# Patient Record
Sex: FEMALE | Race: WHITE | Employment: PART TIME | URBAN - METROPOLITAN AREA
[De-identification: names, ages, dates, MRNs, and addresses within clinical notes are randomized per-mention and may not be internally consistent; named-entity substitution may affect disease eponyms.]

---

## 2017-01-30 PROBLEM — M65.311 TRIGGER FINGER OF RIGHT THUMB: Status: ACTIVE | Noted: 2017-01-30

## 2017-02-21 ENCOUNTER — HOSPITAL ENCOUNTER (OUTPATIENT)
Dept: PREADMISSION TESTING | Facility: CLINIC | Age: 52
Discharge: HOME | End: 2017-02-21
Payer: COMMERCIAL

## 2017-02-21 VITALS
TEMPERATURE: 97 F | DIASTOLIC BLOOD PRESSURE: 92 MMHG | HEIGHT: 67 IN | OXYGEN SATURATION: 100 % | HEART RATE: 71 BPM | WEIGHT: 280 LBS | SYSTOLIC BLOOD PRESSURE: 178 MMHG | BODY MASS INDEX: 43.95 KG/M2 | RESPIRATION RATE: 16 BRPM

## 2017-02-21 PROCEDURE — 93005 ELECTROCARDIOGRAM TRACING: CPT

## 2017-02-23 ENCOUNTER — SURGERY (OUTPATIENT)
Age: 52
End: 2017-02-23

## 2017-02-23 ENCOUNTER — ANESTHESIA EVENT (OUTPATIENT)
Dept: OPERATING ROOM | Facility: CLINIC | Age: 52
End: 2017-02-23
Payer: COMMERCIAL

## 2017-02-23 ENCOUNTER — HOSPITAL ENCOUNTER (OUTPATIENT)
Facility: CLINIC | Age: 52
Setting detail: OUTPATIENT SURGERY
Discharge: HOME | End: 2017-02-23
Attending: PLASTIC SURGERY | Admitting: PLASTIC SURGERY
Payer: COMMERCIAL

## 2017-02-23 ENCOUNTER — ANESTHESIA (OUTPATIENT)
Dept: OPERATING ROOM | Facility: CLINIC | Age: 52
End: 2017-02-23
Payer: COMMERCIAL

## 2017-02-23 VITALS
BODY MASS INDEX: 43.95 KG/M2 | DIASTOLIC BLOOD PRESSURE: 80 MMHG | TEMPERATURE: 96.8 F | OXYGEN SATURATION: 92 % | HEIGHT: 67 IN | RESPIRATION RATE: 17 BRPM | HEART RATE: 68 BPM | SYSTOLIC BLOOD PRESSURE: 130 MMHG | WEIGHT: 280 LBS

## 2017-02-23 VITALS — DIASTOLIC BLOOD PRESSURE: 63 MMHG | TEMPERATURE: 96.2 F | SYSTOLIC BLOOD PRESSURE: 127 MMHG | OXYGEN SATURATION: 95 %

## 2017-02-23 LAB — HCG, PREGNANCY URINE (POC): NEGATIVE

## 2017-02-23 PROCEDURE — 6360000002 HC RX W HCPCS: Performed by: PLASTIC SURGERY

## 2017-02-23 PROCEDURE — 6370000000 HC RX 637 (ALT 250 FOR IP): Performed by: PLASTIC SURGERY

## 2017-02-23 PROCEDURE — 3700000000 HC ANESTHESIA ATTENDED CARE: Performed by: PLASTIC SURGERY

## 2017-02-23 PROCEDURE — 7100000000 HC PACU RECOVERY - FIRST 15 MIN: Performed by: PLASTIC SURGERY

## 2017-02-23 PROCEDURE — 3600000002 HC SURGERY LEVEL 2 BASE: Performed by: PLASTIC SURGERY

## 2017-02-23 PROCEDURE — 7100000001 HC PACU RECOVERY - ADDTL 15 MIN: Performed by: PLASTIC SURGERY

## 2017-02-23 PROCEDURE — 3700000001 HC ADD 15 MINUTES (ANESTHESIA): Performed by: PLASTIC SURGERY

## 2017-02-23 PROCEDURE — 7100000010 HC PHASE II RECOVERY - FIRST 15 MIN: Performed by: PLASTIC SURGERY

## 2017-02-23 PROCEDURE — 6360000002 HC RX W HCPCS: Performed by: ANESTHESIOLOGY

## 2017-02-23 PROCEDURE — 2500000003 HC RX 250 WO HCPCS: Performed by: PLASTIC SURGERY

## 2017-02-23 PROCEDURE — 2500000003 HC RX 250 WO HCPCS: Performed by: ANESTHESIOLOGY

## 2017-02-23 PROCEDURE — 7100000011 HC PHASE II RECOVERY - ADDTL 15 MIN: Performed by: PLASTIC SURGERY

## 2017-02-23 PROCEDURE — 3600000012 HC SURGERY LEVEL 2 ADDTL 15MIN: Performed by: PLASTIC SURGERY

## 2017-02-23 PROCEDURE — 2580000003 HC RX 258: Performed by: ANESTHESIOLOGY

## 2017-02-23 PROCEDURE — 84703 CHORIONIC GONADOTROPIN ASSAY: CPT

## 2017-02-23 RX ORDER — OXYCODONE HYDROCHLORIDE AND ACETAMINOPHEN 5; 325 MG/1; MG/1
1 TABLET ORAL ONCE
Status: COMPLETED | OUTPATIENT
Start: 2017-02-23 | End: 2017-02-23

## 2017-02-23 RX ORDER — SODIUM CHLORIDE, SODIUM LACTATE, POTASSIUM CHLORIDE, CALCIUM CHLORIDE 600; 310; 30; 20 MG/100ML; MG/100ML; MG/100ML; MG/100ML
INJECTION, SOLUTION INTRAVENOUS CONTINUOUS
Status: DISCONTINUED | OUTPATIENT
Start: 2017-02-23 | End: 2017-02-23 | Stop reason: HOSPADM

## 2017-02-23 RX ORDER — GINSENG 100 MG
CAPSULE ORAL PRN
Status: DISCONTINUED | OUTPATIENT
Start: 2017-02-23 | End: 2017-02-23 | Stop reason: HOSPADM

## 2017-02-23 RX ORDER — FENTANYL CITRATE 50 UG/ML
50 INJECTION, SOLUTION INTRAMUSCULAR; INTRAVENOUS EVERY 5 MIN PRN
Status: DISCONTINUED | OUTPATIENT
Start: 2017-02-23 | End: 2017-02-23 | Stop reason: HOSPADM

## 2017-02-23 RX ORDER — ONDANSETRON 2 MG/ML
4 INJECTION INTRAMUSCULAR; INTRAVENOUS
Status: DISCONTINUED | OUTPATIENT
Start: 2017-02-23 | End: 2017-02-23 | Stop reason: HOSPADM

## 2017-02-23 RX ORDER — CEPHALEXIN 500 MG/1
500 CAPSULE ORAL 3 TIMES DAILY
Qty: 21 CAPSULE | Refills: 0 | Status: SHIPPED | OUTPATIENT
Start: 2017-02-23 | End: 2017-03-02

## 2017-02-23 RX ORDER — MEPERIDINE HYDROCHLORIDE 50 MG/ML
12.5 INJECTION INTRAMUSCULAR; INTRAVENOUS; SUBCUTANEOUS EVERY 5 MIN PRN
Status: DISCONTINUED | OUTPATIENT
Start: 2017-02-23 | End: 2017-02-23 | Stop reason: HOSPADM

## 2017-02-23 RX ORDER — SODIUM CHLORIDE, SODIUM LACTATE, POTASSIUM CHLORIDE, CALCIUM CHLORIDE 600; 310; 30; 20 MG/100ML; MG/100ML; MG/100ML; MG/100ML
INJECTION, SOLUTION INTRAVENOUS CONTINUOUS PRN
Status: DISCONTINUED | OUTPATIENT
Start: 2017-02-23 | End: 2017-02-23 | Stop reason: SDUPTHER

## 2017-02-23 RX ORDER — PROPOFOL 10 MG/ML
INJECTION, EMULSION INTRAVENOUS PRN
Status: DISCONTINUED | OUTPATIENT
Start: 2017-02-23 | End: 2017-02-23 | Stop reason: SDUPTHER

## 2017-02-23 RX ORDER — BUPIVACAINE HYDROCHLORIDE 2.5 MG/ML
INJECTION, SOLUTION INFILTRATION; PERINEURAL PRN
Status: DISCONTINUED | OUTPATIENT
Start: 2017-02-23 | End: 2017-02-23 | Stop reason: HOSPADM

## 2017-02-23 RX ORDER — ONDANSETRON 2 MG/ML
INJECTION INTRAMUSCULAR; INTRAVENOUS PRN
Status: DISCONTINUED | OUTPATIENT
Start: 2017-02-23 | End: 2017-02-23 | Stop reason: SDUPTHER

## 2017-02-23 RX ORDER — FENTANYL CITRATE 50 UG/ML
25 INJECTION, SOLUTION INTRAMUSCULAR; INTRAVENOUS EVERY 5 MIN PRN
Status: DISCONTINUED | OUTPATIENT
Start: 2017-02-23 | End: 2017-02-23 | Stop reason: HOSPADM

## 2017-02-23 RX ORDER — DEXAMETHASONE SODIUM PHOSPHATE 10 MG/ML
INJECTION INTRAMUSCULAR; INTRAVENOUS PRN
Status: DISCONTINUED | OUTPATIENT
Start: 2017-02-23 | End: 2017-02-23 | Stop reason: SDUPTHER

## 2017-02-23 RX ORDER — FENTANYL CITRATE 50 UG/ML
INJECTION, SOLUTION INTRAMUSCULAR; INTRAVENOUS PRN
Status: DISCONTINUED | OUTPATIENT
Start: 2017-02-23 | End: 2017-02-23 | Stop reason: SDUPTHER

## 2017-02-23 RX ORDER — LIDOCAINE HYDROCHLORIDE 10 MG/ML
INJECTION, SOLUTION EPIDURAL; INFILTRATION; INTRACAUDAL; PERINEURAL PRN
Status: DISCONTINUED | OUTPATIENT
Start: 2017-02-23 | End: 2017-02-23 | Stop reason: SDUPTHER

## 2017-02-23 RX ORDER — OXYCODONE HYDROCHLORIDE AND ACETAMINOPHEN 5; 325 MG/1; MG/1
2 TABLET ORAL EVERY 4 HOURS PRN
Qty: 30 TABLET | Refills: 0 | Status: SHIPPED | OUTPATIENT
Start: 2017-02-23 | End: 2017-03-02

## 2017-02-23 RX ADMIN — DEXAMETHASONE SODIUM PHOSPHATE 10 MG: 10 INJECTION INTRAMUSCULAR; INTRAVENOUS at 09:47

## 2017-02-23 RX ADMIN — SODIUM CHLORIDE, POTASSIUM CHLORIDE, SODIUM LACTATE AND CALCIUM CHLORIDE: 600; 310; 30; 20 INJECTION, SOLUTION INTRAVENOUS at 09:35

## 2017-02-23 RX ADMIN — BUPIVACAINE HYDROCHLORIDE 5 ML: 2.5 INJECTION, SOLUTION INFILTRATION; PERINEURAL at 09:58

## 2017-02-23 RX ADMIN — LIDOCAINE HYDROCHLORIDE 50 MG: 10 INJECTION, SOLUTION EPIDURAL; INFILTRATION; INTRACAUDAL; PERINEURAL at 09:39

## 2017-02-23 RX ADMIN — BACITRACIN 1 EACH: 500 OINTMENT TOPICAL at 09:58

## 2017-02-23 RX ADMIN — PROPOFOL 200 MG: 10 INJECTION, EMULSION INTRAVENOUS at 09:39

## 2017-02-23 RX ADMIN — OXYCODONE HYDROCHLORIDE AND ACETAMINOPHEN 1 TABLET: 5; 325 TABLET ORAL at 11:02

## 2017-02-23 RX ADMIN — SODIUM CHLORIDE, POTASSIUM CHLORIDE, SODIUM LACTATE AND CALCIUM CHLORIDE: 600; 310; 30; 20 INJECTION, SOLUTION INTRAVENOUS at 09:10

## 2017-02-23 RX ADMIN — Medication 2 G: at 09:32

## 2017-02-23 RX ADMIN — PROPOFOL 100 MG: 10 INJECTION, EMULSION INTRAVENOUS at 09:55

## 2017-02-23 RX ADMIN — FENTANYL CITRATE 25 MCG: 50 INJECTION, SOLUTION INTRAMUSCULAR; INTRAVENOUS at 09:55

## 2017-02-23 RX ADMIN — FENTANYL CITRATE 25 MCG: 50 INJECTION, SOLUTION INTRAMUSCULAR; INTRAVENOUS at 09:51

## 2017-02-23 RX ADMIN — FENTANYL CITRATE 25 MCG: 50 INJECTION, SOLUTION INTRAMUSCULAR; INTRAVENOUS at 10:30

## 2017-02-23 RX ADMIN — FENTANYL CITRATE 50 MCG: 50 INJECTION, SOLUTION INTRAMUSCULAR; INTRAVENOUS at 09:39

## 2017-02-23 RX ADMIN — ONDANSETRON 4 MG: 2 INJECTION INTRAMUSCULAR; INTRAVENOUS at 09:47

## 2017-02-23 ASSESSMENT — PAIN - FUNCTIONAL ASSESSMENT: PAIN_FUNCTIONAL_ASSESSMENT: 0-10

## 2017-02-23 ASSESSMENT — ENCOUNTER SYMPTOMS: STRIDOR: 0

## 2017-02-23 ASSESSMENT — PAIN DESCRIPTION - PAIN TYPE
TYPE: SURGICAL PAIN
TYPE: SURGICAL PAIN

## 2017-02-23 ASSESSMENT — PAIN SCALES - GENERAL
PAINLEVEL_OUTOF10: 9
PAINLEVEL_OUTOF10: 9

## 2017-02-23 ASSESSMENT — PAIN DESCRIPTION - ORIENTATION: ORIENTATION: RIGHT

## 2017-02-23 ASSESSMENT — PAIN DESCRIPTION - LOCATION: LOCATION: FINGER (COMMENT WHICH ONE)

## 2017-02-24 LAB
EKG ATRIAL RATE: 67 BPM
EKG P AXIS: 36 DEGREES
EKG P-R INTERVAL: 128 MS
EKG Q-T INTERVAL: 384 MS
EKG QRS DURATION: 60 MS
EKG QTC CALCULATION (BAZETT): 405 MS
EKG R AXIS: 34 DEGREES
EKG T AXIS: 18 DEGREES
EKG VENTRICULAR RATE: 67 BPM

## 2017-05-30 ENCOUNTER — HOSPITAL ENCOUNTER (OUTPATIENT)
Age: 52
Discharge: HOME | End: 2017-05-30
Payer: COMMERCIAL

## 2017-05-30 LAB
ABSOLUTE EOS #: 0.3 K/UL (ref 0–0.4)
ABSOLUTE LYMPH #: 1.6 K/UL (ref 1–4.8)
ABSOLUTE MONO #: 0.4 K/UL (ref 0.1–1.3)
ALBUMIN SERPL-MCNC: 4.4 G/DL (ref 3.5–5.2)
ALBUMIN/GLOBULIN RATIO: ABNORMAL (ref 1–2.5)
ALP BLD-CCNC: 86 U/L (ref 35–104)
ALT SERPL-CCNC: 25 U/L (ref 5–33)
ANION GAP SERPL CALCULATED.3IONS-SCNC: 16 MMOL/L (ref 9–17)
AST SERPL-CCNC: 17 U/L
BASOPHILS # BLD: 1 %
BASOPHILS ABSOLUTE: 0 K/UL (ref 0–0.2)
BILIRUB SERPL-MCNC: 0.37 MG/DL (ref 0.3–1.2)
BUN BLDV-MCNC: 14 MG/DL (ref 6–20)
BUN/CREAT BLD: ABNORMAL (ref 9–20)
CALCIUM SERPL-MCNC: 9.2 MG/DL (ref 8.6–10.4)
CHLORIDE BLD-SCNC: 105 MMOL/L (ref 98–107)
CHOLESTEROL/HDL RATIO: 8.3
CHOLESTEROL: 231 MG/DL
CO2: 22 MMOL/L (ref 20–31)
CREAT SERPL-MCNC: 0.81 MG/DL (ref 0.5–0.9)
DIFFERENTIAL TYPE: ABNORMAL
EOSINOPHILS RELATIVE PERCENT: 4 %
GFR AFRICAN AMERICAN: >60 ML/MIN
GFR NON-AFRICAN AMERICAN: >60 ML/MIN
GFR SERPL CREATININE-BSD FRML MDRD: ABNORMAL ML/MIN/{1.73_M2}
GFR SERPL CREATININE-BSD FRML MDRD: ABNORMAL ML/MIN/{1.73_M2}
GLUCOSE BLD-MCNC: 111 MG/DL (ref 70–99)
HCT VFR BLD CALC: 43.5 % (ref 36–46)
HDLC SERPL-MCNC: 28 MG/DL
HEMOGLOBIN: 14.3 G/DL (ref 12–16)
LDL CHOLESTEROL: 145 MG/DL (ref 0–130)
LYMPHOCYTES # BLD: 22 %
MCH RBC QN AUTO: 26.3 PG (ref 26–34)
MCHC RBC AUTO-ENTMCNC: 32.8 G/DL (ref 31–37)
MCV RBC AUTO: 80.1 FL (ref 80–100)
MONOCYTES # BLD: 6 %
PDW BLD-RTO: 14.8 % (ref 11.5–14.9)
PLATELET # BLD: 290 K/UL (ref 150–450)
PLATELET ESTIMATE: ABNORMAL
PMV BLD AUTO: 7.9 FL (ref 6–12)
POTASSIUM SERPL-SCNC: 4 MMOL/L (ref 3.7–5.3)
RBC # BLD: 5.43 M/UL (ref 4–5.2)
RBC # BLD: ABNORMAL 10*6/UL
SEG NEUTROPHILS: 67 %
SEGMENTED NEUTROPHILS ABSOLUTE COUNT: 5 K/UL (ref 1.3–9.1)
SODIUM BLD-SCNC: 143 MMOL/L (ref 135–144)
TOTAL PROTEIN: 7.7 G/DL (ref 6.4–8.3)
TRIGL SERPL-MCNC: 289 MG/DL
TSH SERPL DL<=0.05 MIU/L-ACNC: 1.8 MIU/L (ref 0.3–5)
VLDLC SERPL CALC-MCNC: ABNORMAL MG/DL (ref 1–30)
WBC # BLD: 7.4 K/UL (ref 3.5–11)
WBC # BLD: ABNORMAL 10*3/UL

## 2017-05-30 PROCEDURE — 83036 HEMOGLOBIN GLYCOSYLATED A1C: CPT

## 2017-05-30 PROCEDURE — 85025 COMPLETE CBC W/AUTO DIFF WBC: CPT

## 2017-05-30 PROCEDURE — 36415 COLL VENOUS BLD VENIPUNCTURE: CPT

## 2017-05-30 PROCEDURE — 80061 LIPID PANEL: CPT

## 2017-05-30 PROCEDURE — 84443 ASSAY THYROID STIM HORMONE: CPT

## 2017-05-30 PROCEDURE — 80053 COMPREHEN METABOLIC PANEL: CPT

## 2017-05-31 LAB
ESTIMATED AVERAGE GLUCOSE: 111 MG/DL
HBA1C MFR BLD: 5.5 % (ref 4–6)

## 2018-09-30 ENCOUNTER — HOSPITAL ENCOUNTER (EMERGENCY)
Age: 53
Discharge: HOME | End: 2018-09-30
Attending: EMERGENCY MEDICINE
Payer: COMMERCIAL

## 2018-09-30 VITALS
HEART RATE: 80 BPM | RESPIRATION RATE: 20 BRPM | WEIGHT: 280 LBS | SYSTOLIC BLOOD PRESSURE: 135 MMHG | TEMPERATURE: 96.8 F | HEIGHT: 66 IN | BODY MASS INDEX: 45 KG/M2 | DIASTOLIC BLOOD PRESSURE: 55 MMHG | OXYGEN SATURATION: 96 %

## 2018-09-30 DIAGNOSIS — R10.13 EPIGASTRIC PAIN: Primary | ICD-10-CM

## 2018-09-30 DIAGNOSIS — R21 RASH AND OTHER NONSPECIFIC SKIN ERUPTION: ICD-10-CM

## 2018-09-30 LAB
ABSOLUTE EOS #: 0.2 K/UL (ref 0–0.4)
ABSOLUTE IMMATURE GRANULOCYTE: NORMAL K/UL (ref 0–0.3)
ABSOLUTE LYMPH #: 1.3 K/UL (ref 1–4.8)
ABSOLUTE MONO #: 0.3 K/UL (ref 0.1–1.3)
ALBUMIN SERPL-MCNC: 4.1 G/DL (ref 3.5–5.2)
ALBUMIN/GLOBULIN RATIO: ABNORMAL (ref 1–2.5)
ALP BLD-CCNC: 90 U/L (ref 35–104)
ALT SERPL-CCNC: 23 U/L (ref 5–33)
ANION GAP SERPL CALCULATED.3IONS-SCNC: 14 MMOL/L (ref 9–17)
AST SERPL-CCNC: 18 U/L
BASOPHILS # BLD: 1 % (ref 0–2)
BASOPHILS ABSOLUTE: 0 K/UL (ref 0–0.2)
BILIRUB SERPL-MCNC: 0.38 MG/DL (ref 0.3–1.2)
BUN BLDV-MCNC: 9 MG/DL (ref 6–20)
BUN/CREAT BLD: ABNORMAL (ref 9–20)
CALCIUM SERPL-MCNC: 8.7 MG/DL (ref 8.6–10.4)
CHLORIDE BLD-SCNC: 104 MMOL/L (ref 98–107)
CO2: 22 MMOL/L (ref 20–31)
CREAT SERPL-MCNC: 0.8 MG/DL (ref 0.5–0.9)
DIFFERENTIAL TYPE: NORMAL
EKG ATRIAL RATE: 78 BPM
EKG P AXIS: 58 DEGREES
EKG P-R INTERVAL: 170 MS
EKG Q-T INTERVAL: 390 MS
EKG QRS DURATION: 86 MS
EKG QTC CALCULATION (BAZETT): 444 MS
EKG R AXIS: 27 DEGREES
EKG T AXIS: 19 DEGREES
EKG VENTRICULAR RATE: 78 BPM
EOSINOPHILS RELATIVE PERCENT: 4 % (ref 0–4)
GFR AFRICAN AMERICAN: >60 ML/MIN
GFR NON-AFRICAN AMERICAN: >60 ML/MIN
GFR SERPL CREATININE-BSD FRML MDRD: ABNORMAL ML/MIN/{1.73_M2}
GFR SERPL CREATININE-BSD FRML MDRD: ABNORMAL ML/MIN/{1.73_M2}
GLUCOSE BLD-MCNC: 103 MG/DL (ref 70–99)
HCT VFR BLD CALC: 39.1 % (ref 36–46)
HEMOGLOBIN: 12.9 G/DL (ref 12–16)
IMMATURE GRANULOCYTES: NORMAL %
LYMPHOCYTES # BLD: 27 % (ref 24–44)
MCH RBC QN AUTO: 26.5 PG (ref 26–34)
MCHC RBC AUTO-ENTMCNC: 33 G/DL (ref 31–37)
MCV RBC AUTO: 80.5 FL (ref 80–100)
MONOCYTES # BLD: 6 % (ref 1–7)
MYOGLOBIN: 53 NG/ML (ref 25–58)
NRBC AUTOMATED: NORMAL PER 100 WBC
PDW BLD-RTO: 14.8 % (ref 11.5–14.9)
PLATELET # BLD: 242 K/UL (ref 150–450)
PLATELET ESTIMATE: NORMAL
PMV BLD AUTO: 8 FL (ref 6–12)
POTASSIUM SERPL-SCNC: 3.7 MMOL/L (ref 3.7–5.3)
RBC # BLD: 4.85 M/UL (ref 4–5.2)
RBC # BLD: NORMAL 10*6/UL
SEG NEUTROPHILS: 62 % (ref 36–66)
SEGMENTED NEUTROPHILS ABSOLUTE COUNT: 3.1 K/UL (ref 1.3–9.1)
SODIUM BLD-SCNC: 140 MMOL/L (ref 135–144)
TOTAL PROTEIN: 7 G/DL (ref 6.4–8.3)
TROPONIN INTERP: NORMAL
TROPONIN T: <0.03 NG/ML
WBC # BLD: 5 K/UL (ref 3.5–11)
WBC # BLD: NORMAL 10*3/UL

## 2018-09-30 PROCEDURE — 85025 COMPLETE CBC W/AUTO DIFF WBC: CPT

## 2018-09-30 PROCEDURE — 2580000003 HC RX 258: Performed by: EMERGENCY MEDICINE

## 2018-09-30 PROCEDURE — 99284 EMERGENCY DEPT VISIT MOD MDM: CPT

## 2018-09-30 PROCEDURE — 6370000000 HC RX 637 (ALT 250 FOR IP): Performed by: EMERGENCY MEDICINE

## 2018-09-30 PROCEDURE — 96372 THER/PROPH/DIAG INJ SC/IM: CPT

## 2018-09-30 PROCEDURE — 83874 ASSAY OF MYOGLOBIN: CPT

## 2018-09-30 PROCEDURE — 6360000002 HC RX W HCPCS: Performed by: EMERGENCY MEDICINE

## 2018-09-30 PROCEDURE — 2500000003 HC RX 250 WO HCPCS: Performed by: EMERGENCY MEDICINE

## 2018-09-30 PROCEDURE — 36415 COLL VENOUS BLD VENIPUNCTURE: CPT

## 2018-09-30 PROCEDURE — 96374 THER/PROPH/DIAG INJ IV PUSH: CPT

## 2018-09-30 PROCEDURE — 93005 ELECTROCARDIOGRAM TRACING: CPT

## 2018-09-30 PROCEDURE — 80053 COMPREHEN METABOLIC PANEL: CPT

## 2018-09-30 PROCEDURE — 84484 ASSAY OF TROPONIN QUANT: CPT

## 2018-09-30 PROCEDURE — S0028 INJECTION, FAMOTIDINE, 20 MG: HCPCS | Performed by: EMERGENCY MEDICINE

## 2018-09-30 RX ORDER — PROMETHAZINE HYDROCHLORIDE 25 MG/ML
25 INJECTION, SOLUTION INTRAMUSCULAR; INTRAVENOUS ONCE
Status: COMPLETED | OUTPATIENT
Start: 2018-09-30 | End: 2018-09-30

## 2018-09-30 RX ORDER — MAGNESIUM HYDROXIDE/ALUMINUM HYDROXICE/SIMETHICONE 120; 1200; 1200 MG/30ML; MG/30ML; MG/30ML
30 SUSPENSION ORAL
Status: COMPLETED | OUTPATIENT
Start: 2018-09-30 | End: 2018-09-30

## 2018-09-30 RX ORDER — 0.9 % SODIUM CHLORIDE 0.9 %
1000 INTRAVENOUS SOLUTION INTRAVENOUS ONCE
Status: COMPLETED | OUTPATIENT
Start: 2018-09-30 | End: 2018-09-30

## 2018-09-30 RX ORDER — ESOMEPRAZOLE MAGNESIUM 40 MG/1
40 CAPSULE, DELAYED RELEASE ORAL
Qty: 30 CAPSULE | Refills: 0 | Status: SHIPPED | OUTPATIENT
Start: 2018-09-30 | End: 2020-01-10

## 2018-09-30 RX ORDER — PROMETHAZINE HYDROCHLORIDE 25 MG/ML
12.5 INJECTION, SOLUTION INTRAMUSCULAR; INTRAVENOUS ONCE
Status: DISCONTINUED | OUTPATIENT
Start: 2018-09-30 | End: 2018-09-30

## 2018-09-30 RX ORDER — PERMETHRIN 50 MG/G
CREAM TOPICAL
Qty: 1 TUBE | Refills: 1 | Status: SHIPPED | OUTPATIENT
Start: 2018-09-30 | End: 2020-01-10

## 2018-09-30 RX ADMIN — PROMETHAZINE HYDROCHLORIDE 25 MG: 25 INJECTION INTRAMUSCULAR; INTRAVENOUS at 20:49

## 2018-09-30 RX ADMIN — SODIUM CHLORIDE 1000 ML: 9 INJECTION, SOLUTION INTRAVENOUS at 20:54

## 2018-09-30 RX ADMIN — ALUMINUM HYDROXIDE, MAGNESIUM HYDROXIDE, AND SIMETHICONE 30 ML: 200; 200; 20 SUSPENSION ORAL at 20:49

## 2018-09-30 RX ADMIN — FAMOTIDINE 20 MG: 10 INJECTION, SOLUTION INTRAVENOUS at 20:49

## 2018-09-30 RX ADMIN — LIDOCAINE HYDROCHLORIDE 15 ML: 20 SOLUTION ORAL; TOPICAL at 20:49

## 2018-09-30 ASSESSMENT — ENCOUNTER SYMPTOMS
COUGH: 0
EYE PAIN: 0
ABDOMINAL PAIN: 1
VOMITING: 0
SHORTNESS OF BREATH: 0
NAUSEA: 1
RHINORRHEA: 0
BACK PAIN: 0
EYE REDNESS: 0

## 2018-09-30 ASSESSMENT — PAIN DESCRIPTION - PAIN TYPE
TYPE: ACUTE PAIN;CHRONIC PAIN
TYPE: ACUTE PAIN;CHRONIC PAIN

## 2018-09-30 ASSESSMENT — PAIN SCALES - GENERAL
PAINLEVEL_OUTOF10: 9
PAINLEVEL_OUTOF10: 9

## 2018-10-01 NOTE — ED PROVIDER NOTES
[facility] OR    TONSILLECTOMY         CURRENT MEDICATIONS       Previous Medications    ACETAMINOPHEN (TYLENOL) 325 MG TABLET    Take 650 mg by mouth every 6 hours as needed for Pain       ALLERGIES     Asa [aspirin] and Ibuprofen    FAMILY HISTORY       No family status information on file. History reviewed. No pertinent family history. SOCIAL HISTORY       Social History     Social History    Marital status: Single     Spouse name: N/A    Number of children: N/A    Years of education: N/A     Social History Main Topics    Smoking status: Never Smoker    Smokeless tobacco: Never Used    Alcohol use No    Drug use: No    Sexual activity: Not Asked     Other Topics Concern    None     Social History Narrative    None       PHYSICAL EXAM     INITIAL VITALS:  height is 5' 6\" (1.676 m) and weight is 280 lb (127 kg). Her oral temperature is 96.8 °F (36 °C). Her blood pressure is 175/84 (abnormal) and her pulse is 77. Her respiration is 16 and oxygen saturation is 97%. Physical Exam   Constitutional: She is oriented to person, place, and time. She appears well-developed and well-nourished. HENT:   Head: Normocephalic and atraumatic. Nose: Nose normal.   Mouth/Throat: Oropharynx is clear and moist.   Eyes: Pupils are equal, round, and reactive to light. Conjunctivae and EOM are normal.   Neck: Normal range of motion. Neck supple. Cardiovascular: Normal rate, regular rhythm, normal heart sounds and intact distal pulses. Pulmonary/Chest: Effort normal and breath sounds normal.   Abdominal: Soft. Bowel sounds are normal. There is tenderness (Tenderness mild epigastric). Musculoskeletal: Normal range of motion. Neurological: She is alert and oriented to person, place, and time. Skin: Skin is warm and dry. Multiple skin lesions throughout the body individual appear to be isolated vesicular that rupture   Nursing note and vitals reviewed. Natasha Glasgow     DIFFERENTIAL DIAGNOSIS/ MDM:     Patient

## 2019-04-16 ENCOUNTER — HOSPITAL ENCOUNTER (OUTPATIENT)
Age: 54
Discharge: HOME | End: 2019-04-16
Payer: COMMERCIAL

## 2019-04-16 LAB
ABSOLUTE EOS #: 0.26 K/UL (ref 0–0.4)
ABSOLUTE IMMATURE GRANULOCYTE: ABNORMAL K/UL (ref 0–0.3)
ABSOLUTE LYMPH #: 1.28 K/UL (ref 1–4.8)
ABSOLUTE MONO #: 0.41 K/UL (ref 0.1–1.3)
ALBUMIN SERPL-MCNC: 3.9 G/DL (ref 3.5–5.2)
ALBUMIN/GLOBULIN RATIO: ABNORMAL (ref 1–2.5)
ALP BLD-CCNC: 91 U/L (ref 35–104)
ALT SERPL-CCNC: 17 U/L (ref 5–33)
ANION GAP SERPL CALCULATED.3IONS-SCNC: 9 MMOL/L (ref 9–17)
AST SERPL-CCNC: 13 U/L
BASOPHILS # BLD: 1 % (ref 0–2)
BASOPHILS ABSOLUTE: 0.05 K/UL (ref 0–0.2)
BILIRUB SERPL-MCNC: 0.22 MG/DL (ref 0.3–1.2)
BUN BLDV-MCNC: 9 MG/DL (ref 6–20)
BUN/CREAT BLD: ABNORMAL (ref 9–20)
CALCIUM SERPL-MCNC: 8.6 MG/DL (ref 8.6–10.4)
CHLORIDE BLD-SCNC: 107 MMOL/L (ref 98–107)
CHOLESTEROL/HDL RATIO: 5.9
CHOLESTEROL: 184 MG/DL
CO2: 22 MMOL/L (ref 20–31)
CREAT SERPL-MCNC: 0.68 MG/DL (ref 0.5–0.9)
CREATININE URINE: 212.2 MG/DL (ref 28–217)
DIFFERENTIAL TYPE: ABNORMAL
EOSINOPHILS RELATIVE PERCENT: 5 % (ref 0–4)
GFR AFRICAN AMERICAN: >60 ML/MIN
GFR NON-AFRICAN AMERICAN: >60 ML/MIN
GFR SERPL CREATININE-BSD FRML MDRD: ABNORMAL ML/MIN/{1.73_M2}
GFR SERPL CREATININE-BSD FRML MDRD: ABNORMAL ML/MIN/{1.73_M2}
GLUCOSE BLD-MCNC: 93 MG/DL (ref 70–99)
HCT VFR BLD CALC: 35.1 % (ref 36–46)
HDLC SERPL-MCNC: 31 MG/DL
HEMOGLOBIN: 11.2 G/DL (ref 12–16)
IMMATURE GRANULOCYTES: ABNORMAL %
LDL CHOLESTEROL: 110 MG/DL (ref 0–130)
LYMPHOCYTES # BLD: 25 % (ref 24–44)
MCH RBC QN AUTO: 23.8 PG (ref 26–34)
MCHC RBC AUTO-ENTMCNC: 31.9 G/DL (ref 31–37)
MCV RBC AUTO: 74.5 FL (ref 80–100)
MICROALBUMIN/CREAT 24H UR: <12 MG/L
MICROALBUMIN/CREAT UR-RTO: NORMAL MCG/MG CREAT
MONOCYTES # BLD: 8 % (ref 1–7)
MORPHOLOGY: ABNORMAL
NRBC AUTOMATED: ABNORMAL PER 100 WBC
PDW BLD-RTO: 15.7 % (ref 11.5–14.9)
PLATELET # BLD: 261 K/UL (ref 150–450)
PLATELET ESTIMATE: ABNORMAL
PMV BLD AUTO: 7.6 FL (ref 6–12)
POTASSIUM SERPL-SCNC: 3.7 MMOL/L (ref 3.7–5.3)
RBC # BLD: 4.71 M/UL (ref 4–5.2)
RBC # BLD: ABNORMAL 10*6/UL
SEG NEUTROPHILS: 61 % (ref 36–66)
SEGMENTED NEUTROPHILS ABSOLUTE COUNT: 3.1 K/UL (ref 1.3–9.1)
SODIUM BLD-SCNC: 138 MMOL/L (ref 135–144)
TOTAL PROTEIN: 7 G/DL (ref 6.4–8.3)
TRIGL SERPL-MCNC: 215 MG/DL
TSH SERPL DL<=0.05 MIU/L-ACNC: 2.16 MIU/L (ref 0.3–5)
VLDLC SERPL CALC-MCNC: ABNORMAL MG/DL (ref 1–30)
WBC # BLD: 5.1 K/UL (ref 3.5–11)
WBC # BLD: ABNORMAL 10*3/UL

## 2019-04-16 PROCEDURE — 82570 ASSAY OF URINE CREATININE: CPT

## 2019-04-16 PROCEDURE — 36415 COLL VENOUS BLD VENIPUNCTURE: CPT

## 2019-04-16 PROCEDURE — 83036 HEMOGLOBIN GLYCOSYLATED A1C: CPT

## 2019-04-16 PROCEDURE — 80053 COMPREHEN METABOLIC PANEL: CPT

## 2019-04-16 PROCEDURE — 84443 ASSAY THYROID STIM HORMONE: CPT

## 2019-04-16 PROCEDURE — 80061 LIPID PANEL: CPT

## 2019-04-16 PROCEDURE — 85025 COMPLETE CBC W/AUTO DIFF WBC: CPT

## 2019-04-16 PROCEDURE — 82043 UR ALBUMIN QUANTITATIVE: CPT

## 2019-04-17 LAB
ESTIMATED AVERAGE GLUCOSE: 123 MG/DL
HBA1C MFR BLD: 5.9 % (ref 4–6)

## 2019-12-13 ENCOUNTER — HOSPITAL ENCOUNTER (OUTPATIENT)
Age: 54
Discharge: HOME | End: 2019-12-13
Payer: COMMERCIAL

## 2019-12-13 LAB
ABSOLUTE EOS #: 0.1 K/UL (ref 0–0.4)
ABSOLUTE IMMATURE GRANULOCYTE: ABNORMAL K/UL (ref 0–0.3)
ABSOLUTE LYMPH #: 1.4 K/UL (ref 1–4.8)
ABSOLUTE MONO #: 0.4 K/UL (ref 0.1–1.3)
ALBUMIN SERPL-MCNC: 4.2 G/DL (ref 3.5–5.2)
ALBUMIN/GLOBULIN RATIO: ABNORMAL (ref 1–2.5)
ALP BLD-CCNC: 77 U/L (ref 35–104)
ALT SERPL-CCNC: 18 U/L (ref 5–33)
ANION GAP SERPL CALCULATED.3IONS-SCNC: 15 MMOL/L (ref 9–17)
AST SERPL-CCNC: 15 U/L
BASOPHILS # BLD: 1 % (ref 0–2)
BASOPHILS ABSOLUTE: 0 K/UL (ref 0–0.2)
BILIRUB SERPL-MCNC: 0.57 MG/DL (ref 0.3–1.2)
BUN BLDV-MCNC: 17 MG/DL (ref 6–20)
BUN/CREAT BLD: ABNORMAL (ref 9–20)
CALCIUM SERPL-MCNC: 8.8 MG/DL (ref 8.6–10.4)
CHLORIDE BLD-SCNC: 101 MMOL/L (ref 98–107)
CHOLESTEROL/HDL RATIO: 6.4
CHOLESTEROL: 191 MG/DL
CO2: 23 MMOL/L (ref 20–31)
CREAT SERPL-MCNC: 0.78 MG/DL (ref 0.5–0.9)
DIFFERENTIAL TYPE: ABNORMAL
EOSINOPHILS RELATIVE PERCENT: 2 % (ref 0–4)
FERRITIN: 25 UG/L (ref 13–150)
GFR AFRICAN AMERICAN: >60 ML/MIN
GFR NON-AFRICAN AMERICAN: >60 ML/MIN
GFR SERPL CREATININE-BSD FRML MDRD: ABNORMAL ML/MIN/{1.73_M2}
GFR SERPL CREATININE-BSD FRML MDRD: ABNORMAL ML/MIN/{1.73_M2}
GLUCOSE BLD-MCNC: 110 MG/DL (ref 70–99)
HCT VFR BLD CALC: 38.6 % (ref 36–46)
HDLC SERPL-MCNC: 30 MG/DL
HEMOGLOBIN: 12.4 G/DL (ref 12–16)
IMMATURE GRANULOCYTES: ABNORMAL %
IRON SATURATION: 31 % (ref 20–55)
IRON: 91 UG/DL (ref 37–145)
LDL CHOLESTEROL: 131 MG/DL (ref 0–130)
LYMPHOCYTES # BLD: 23 % (ref 24–44)
MCH RBC QN AUTO: 26.4 PG (ref 26–34)
MCHC RBC AUTO-ENTMCNC: 32.2 G/DL (ref 31–37)
MCV RBC AUTO: 82.1 FL (ref 80–100)
MONOCYTES # BLD: 6 % (ref 1–7)
NRBC AUTOMATED: ABNORMAL PER 100 WBC
PDW BLD-RTO: 15.6 % (ref 11.5–14.9)
PLATELET # BLD: 244 K/UL (ref 150–450)
PLATELET ESTIMATE: ABNORMAL
PMV BLD AUTO: 8 FL (ref 6–12)
POTASSIUM SERPL-SCNC: 3.8 MMOL/L (ref 3.7–5.3)
RBC # BLD: 4.7 M/UL (ref 4–5.2)
RBC # BLD: ABNORMAL 10*6/UL
SEG NEUTROPHILS: 68 % (ref 36–66)
SEGMENTED NEUTROPHILS ABSOLUTE COUNT: 4 K/UL (ref 1.3–9.1)
SODIUM BLD-SCNC: 139 MMOL/L (ref 135–144)
TOTAL IRON BINDING CAPACITY: 298 UG/DL (ref 250–450)
TOTAL PROTEIN: 7.1 G/DL (ref 6.4–8.3)
TRIGL SERPL-MCNC: 152 MG/DL
UNSATURATED IRON BINDING CAPACITY: 207 UG/DL (ref 112–347)
VITAMIN B-12: 317 PG/ML (ref 232–1245)
VLDLC SERPL CALC-MCNC: ABNORMAL MG/DL (ref 1–30)
WBC # BLD: 5.9 K/UL (ref 3.5–11)
WBC # BLD: ABNORMAL 10*3/UL

## 2019-12-13 PROCEDURE — 83540 ASSAY OF IRON: CPT

## 2019-12-13 PROCEDURE — 83550 IRON BINDING TEST: CPT

## 2019-12-13 PROCEDURE — 83036 HEMOGLOBIN GLYCOSYLATED A1C: CPT

## 2019-12-13 PROCEDURE — 85025 COMPLETE CBC W/AUTO DIFF WBC: CPT

## 2019-12-13 PROCEDURE — 80053 COMPREHEN METABOLIC PANEL: CPT

## 2019-12-13 PROCEDURE — 36415 COLL VENOUS BLD VENIPUNCTURE: CPT

## 2019-12-13 PROCEDURE — 82607 VITAMIN B-12: CPT

## 2019-12-13 PROCEDURE — 80061 LIPID PANEL: CPT

## 2019-12-13 PROCEDURE — 82728 ASSAY OF FERRITIN: CPT

## 2019-12-16 LAB
ESTIMATED AVERAGE GLUCOSE: 111 MG/DL
HBA1C MFR BLD: 5.5 % (ref 4–6)

## 2020-03-21 ENCOUNTER — OFFICE VISIT (OUTPATIENT)
Dept: PRIMARY CARE CLINIC | Age: 55
End: 2020-03-21
Payer: COMMERCIAL

## 2020-03-21 VITALS
WEIGHT: 283.2 LBS | HEART RATE: 95 BPM | SYSTOLIC BLOOD PRESSURE: 135 MMHG | TEMPERATURE: 99.4 F | BODY MASS INDEX: 45.71 KG/M2 | DIASTOLIC BLOOD PRESSURE: 95 MMHG

## 2020-03-21 PROCEDURE — 3017F COLORECTAL CA SCREEN DOC REV: CPT | Performed by: FAMILY MEDICINE

## 2020-03-21 PROCEDURE — 1036F TOBACCO NON-USER: CPT | Performed by: FAMILY MEDICINE

## 2020-03-21 PROCEDURE — G8427 DOCREV CUR MEDS BY ELIG CLIN: HCPCS | Performed by: FAMILY MEDICINE

## 2020-03-21 PROCEDURE — G8417 CALC BMI ABV UP PARAM F/U: HCPCS | Performed by: FAMILY MEDICINE

## 2020-03-21 PROCEDURE — G8484 FLU IMMUNIZE NO ADMIN: HCPCS | Performed by: FAMILY MEDICINE

## 2020-03-21 PROCEDURE — 99213 OFFICE O/P EST LOW 20 MIN: CPT | Performed by: FAMILY MEDICINE

## 2020-03-21 RX ORDER — TRAMADOL HYDROCHLORIDE 50 MG/1
50 TABLET ORAL 2 TIMES DAILY
Qty: 10 TABLET | Refills: 0 | Status: SHIPPED | OUTPATIENT
Start: 2020-03-21 | End: 2020-03-26

## 2020-03-21 RX ORDER — ACYCLOVIR 400 MG/1
800 TABLET ORAL
Qty: 50 TABLET | Refills: 1 | Status: SHIPPED | OUTPATIENT
Start: 2020-03-21 | End: 2020-03-31

## 2020-03-21 ASSESSMENT — ENCOUNTER SYMPTOMS
ALLERGIC/IMMUNOLOGIC NEGATIVE: 1
GASTROINTESTINAL NEGATIVE: 1
EYES NEGATIVE: 1
RESPIRATORY NEGATIVE: 1

## 2020-03-21 ASSESSMENT — PATIENT HEALTH QUESTIONNAIRE - PHQ9
2. FEELING DOWN, DEPRESSED OR HOPELESS: 0
SUM OF ALL RESPONSES TO PHQ QUESTIONS 1-9: 0
1. LITTLE INTEREST OR PLEASURE IN DOING THINGS: 0
SUM OF ALL RESPONSES TO PHQ QUESTIONS 1-9: 0
SUM OF ALL RESPONSES TO PHQ9 QUESTIONS 1 & 2: 0

## 2020-03-21 NOTE — PROGRESS NOTES
Medications   Medication Sig Dispense Refill    lisinopril-hydrochlorothiazide (PRINZIDE;ZESTORETIC) 20-25 MG per tablet        No current facility-administered medications for this visit. Allergies   Allergen Reactions    Asa [Aspirin]      Due to history of ulcers    Ibuprofen Other (See Comments)     Abdominal cramping        Health Maintenance   Topic Date Due    HIV screen  06/08/1982    DTaP/Tdap/Td vaccine (1 - Tdap) 06/08/1986    Cervical cancer screen  06/08/1988    Breast cancer screen  06/08/2017    Shingles Vaccine (1 of 2) 06/08/2017    Colon cancer screen colonoscopy  06/08/2017    Flu vaccine (1) 09/01/2019    Potassium monitoring  12/13/2020    Creatinine monitoring  12/13/2020    Diabetes screen  12/13/2022    Lipid screen  12/13/2024    Hepatitis A vaccine  Aged Out    Hepatitis B vaccine  Aged Out    Hib vaccine  Aged Out    Meningococcal (ACWY) vaccine  Aged Out    Pneumococcal 0-64 years Vaccine  Aged Out       Subjective:     Review of Systems   Constitutional: Negative. HENT: Negative. Eyes: Negative. Respiratory: Negative. Cardiovascular: Negative. Gastrointestinal: Negative. Endocrine: Negative. Genitourinary: Negative. Musculoskeletal: Negative. Skin: Positive for rash and wound. Allergic/Immunologic: Negative. Neurological: Negative. Hematological: Negative. Psychiatric/Behavioral: Negative. All other systems reviewed and are negative. Objective:     Physical Exam  Vitals signs and nursing note reviewed. Constitutional:       Appearance: She is well-developed. HENT:      Head: Normocephalic and atraumatic. Right Ear: External ear normal.      Left Ear: External ear normal.      Nose: Nose normal.   Eyes:      Conjunctiva/sclera: Conjunctivae normal.      Pupils: Pupils are equal, round, and reactive to light. Neck:      Musculoskeletal: Normal range of motion and neck supple.    Cardiovascular:      Rate and

## 2020-07-08 ENCOUNTER — HOSPITAL ENCOUNTER (OUTPATIENT)
Age: 55
Setting detail: SPECIMEN
Discharge: HOME | End: 2020-07-08
Payer: COMMERCIAL

## 2020-07-08 LAB
-: NORMAL
ABSOLUTE EOS #: 0.15 K/UL (ref 0–0.44)
ABSOLUTE IMMATURE GRANULOCYTE: <0.03 K/UL (ref 0–0.3)
ABSOLUTE LYMPH #: 1.01 K/UL (ref 1.1–3.7)
ABSOLUTE MONO #: 0.2 K/UL (ref 0.1–1.2)
ALBUMIN SERPL-MCNC: 4.3 G/DL (ref 3.5–5.2)
ALBUMIN/GLOBULIN RATIO: 1.4 (ref 1–2.5)
ALP BLD-CCNC: 80 U/L (ref 35–104)
ALT SERPL-CCNC: 26 U/L (ref 5–33)
AMORPHOUS: NORMAL
ANION GAP SERPL CALCULATED.3IONS-SCNC: 18 MMOL/L (ref 9–17)
AST SERPL-CCNC: 25 U/L
BACTERIA: NORMAL
BASOPHILS # BLD: 1 % (ref 0–2)
BASOPHILS ABSOLUTE: 0.03 K/UL (ref 0–0.2)
BILIRUB SERPL-MCNC: 0.43 MG/DL (ref 0.3–1.2)
BILIRUBIN URINE: NEGATIVE
BUN BLDV-MCNC: 12 MG/DL (ref 6–20)
BUN/CREAT BLD: ABNORMAL (ref 9–20)
CALCIUM SERPL-MCNC: 8.9 MG/DL (ref 8.6–10.4)
CASTS UA: NORMAL /LPF (ref 0–8)
CHLORIDE BLD-SCNC: 105 MMOL/L (ref 98–107)
CHOLESTEROL/HDL RATIO: 7.1
CHOLESTEROL: 213 MG/DL
CO2: 22 MMOL/L (ref 20–31)
COLOR: YELLOW
COMMENT UA: ABNORMAL
CREAT SERPL-MCNC: 0.75 MG/DL (ref 0.5–0.9)
CRYSTALS, UA: NORMAL /HPF
DIFFERENTIAL TYPE: ABNORMAL
EOSINOPHILS RELATIVE PERCENT: 4 % (ref 1–4)
EPITHELIAL CELLS UA: NORMAL /HPF (ref 0–5)
GFR AFRICAN AMERICAN: >60 ML/MIN
GFR NON-AFRICAN AMERICAN: >60 ML/MIN
GFR SERPL CREATININE-BSD FRML MDRD: ABNORMAL ML/MIN/{1.73_M2}
GFR SERPL CREATININE-BSD FRML MDRD: ABNORMAL ML/MIN/{1.73_M2}
GLUCOSE BLD-MCNC: 156 MG/DL (ref 70–99)
GLUCOSE URINE: NEGATIVE
HCT VFR BLD CALC: 43.9 % (ref 36.3–47.1)
HDLC SERPL-MCNC: 30 MG/DL
HEMOGLOBIN: 12.6 G/DL (ref 11.9–15.1)
IMMATURE GRANULOCYTES: 1 %
KETONES, URINE: NEGATIVE
LDL CHOLESTEROL: 140 MG/DL (ref 0–130)
LEUKOCYTE ESTERASE, URINE: NEGATIVE
LYMPHOCYTES # BLD: 25 % (ref 24–43)
MCH RBC QN AUTO: 24.7 PG (ref 25.2–33.5)
MCHC RBC AUTO-ENTMCNC: 28.7 G/DL (ref 28.4–34.8)
MCV RBC AUTO: 86.1 FL (ref 82.6–102.9)
MONOCYTES # BLD: 5 % (ref 3–12)
MUCUS: NORMAL
NITRITE, URINE: NEGATIVE
NRBC AUTOMATED: 0 PER 100 WBC
OTHER OBSERVATIONS UA: NORMAL
PDW BLD-RTO: 15.9 % (ref 11.8–14.4)
PH UA: 5 (ref 5–8)
PLATELET # BLD: 177 K/UL (ref 138–453)
PLATELET ESTIMATE: ABNORMAL
PMV BLD AUTO: 11.7 FL (ref 8.1–13.5)
POTASSIUM SERPL-SCNC: 3.8 MMOL/L (ref 3.7–5.3)
PROTEIN UA: NEGATIVE
RBC # BLD: 5.1 M/UL (ref 3.95–5.11)
RBC # BLD: ABNORMAL 10*6/UL
RBC UA: NORMAL /HPF (ref 0–4)
RENAL EPITHELIAL, UA: NORMAL /HPF
SEG NEUTROPHILS: 64 % (ref 36–65)
SEGMENTED NEUTROPHILS ABSOLUTE COUNT: 2.61 K/UL (ref 1.5–8.1)
SODIUM BLD-SCNC: 145 MMOL/L (ref 135–144)
SPECIFIC GRAVITY UA: 1.02 (ref 1–1.03)
TOTAL PROTEIN: 7.3 G/DL (ref 6.4–8.3)
TRICHOMONAS: NORMAL
TRIGL SERPL-MCNC: 216 MG/DL
TSH SERPL DL<=0.05 MIU/L-ACNC: 2.04 MIU/L (ref 0.3–5)
TURBIDITY: ABNORMAL
URINE HGB: NEGATIVE
UROBILINOGEN, URINE: NORMAL
VITAMIN D 25-HYDROXY: 19.8 NG/ML (ref 30–100)
VLDLC SERPL CALC-MCNC: ABNORMAL MG/DL (ref 1–30)
WBC # BLD: 4 K/UL (ref 3.5–11.3)
WBC # BLD: ABNORMAL 10*3/UL
WBC UA: NORMAL /HPF (ref 0–5)
YEAST: NORMAL

## 2020-11-11 ENCOUNTER — HOSPITAL ENCOUNTER (OUTPATIENT)
Age: 55
Discharge: HOME | End: 2020-11-11
Payer: COMMERCIAL

## 2020-11-11 LAB
ABSOLUTE EOS #: 0.13 K/UL (ref 0–0.44)
ABSOLUTE IMMATURE GRANULOCYTE: 0.04 K/UL (ref 0–0.3)
ABSOLUTE LYMPH #: 1.19 K/UL (ref 1.1–3.7)
ABSOLUTE MONO #: 0.36 K/UL (ref 0.1–1.2)
ALT SERPL-CCNC: 28 U/L (ref 5–33)
ANION GAP SERPL CALCULATED.3IONS-SCNC: 11 MMOL/L (ref 9–17)
BASOPHILS # BLD: 0 % (ref 0–2)
BASOPHILS ABSOLUTE: <0.03 K/UL (ref 0–0.2)
BUN BLDV-MCNC: 12 MG/DL (ref 6–20)
BUN/CREAT BLD: ABNORMAL (ref 9–20)
CALCIUM SERPL-MCNC: 8.9 MG/DL (ref 8.6–10.4)
CHLORIDE BLD-SCNC: 107 MMOL/L (ref 98–107)
CHOLESTEROL/HDL RATIO: 3.5
CHOLESTEROL: 111 MG/DL
CO2: 25 MMOL/L (ref 20–31)
CREAT SERPL-MCNC: 0.69 MG/DL (ref 0.5–0.9)
D-DIMER QUANTITATIVE: 0.33 MG/L FEU
DIFFERENTIAL TYPE: ABNORMAL
EOSINOPHILS RELATIVE PERCENT: 3 % (ref 1–4)
GFR AFRICAN AMERICAN: >60 ML/MIN
GFR NON-AFRICAN AMERICAN: >60 ML/MIN
GFR SERPL CREATININE-BSD FRML MDRD: ABNORMAL ML/MIN/{1.73_M2}
GFR SERPL CREATININE-BSD FRML MDRD: ABNORMAL ML/MIN/{1.73_M2}
GLUCOSE BLD-MCNC: 120 MG/DL (ref 70–99)
HCT VFR BLD CALC: 39.8 % (ref 36.3–47.1)
HDLC SERPL-MCNC: 32 MG/DL
HEMOGLOBIN: 12.4 G/DL (ref 11.9–15.1)
IMMATURE GRANULOCYTES: 1 %
LDL CHOLESTEROL: 56 MG/DL (ref 0–130)
LYMPHOCYTES # BLD: 27 % (ref 24–43)
MAGNESIUM: 1.9 MG/DL (ref 1.6–2.6)
MCH RBC QN AUTO: 26.7 PG (ref 25.2–33.5)
MCHC RBC AUTO-ENTMCNC: 31.2 G/DL (ref 28.4–34.8)
MCV RBC AUTO: 85.6 FL (ref 82.6–102.9)
MONOCYTES # BLD: 8 % (ref 3–12)
NRBC AUTOMATED: 0 PER 100 WBC
PDW BLD-RTO: 14.2 % (ref 11.8–14.4)
PLATELET # BLD: 220 K/UL (ref 138–453)
PLATELET ESTIMATE: ABNORMAL
PMV BLD AUTO: 10.3 FL (ref 8.1–13.5)
POTASSIUM SERPL-SCNC: 3.1 MMOL/L (ref 3.7–5.3)
RBC # BLD: 4.65 M/UL (ref 3.95–5.11)
RBC # BLD: ABNORMAL 10*6/UL
SEG NEUTROPHILS: 61 % (ref 36–65)
SEGMENTED NEUTROPHILS ABSOLUTE COUNT: 2.62 K/UL (ref 1.5–8.1)
SODIUM BLD-SCNC: 143 MMOL/L (ref 135–144)
T3 TOTAL: 114 NG/DL (ref 60–181)
THYROXINE, FREE: 1.05 NG/DL (ref 0.93–1.7)
TRIGL SERPL-MCNC: 116 MG/DL
VLDLC SERPL CALC-MCNC: ABNORMAL MG/DL (ref 1–30)
WBC # BLD: 4.4 K/UL (ref 3.5–11.3)
WBC # BLD: ABNORMAL 10*3/UL

## 2020-11-11 PROCEDURE — 84439 ASSAY OF FREE THYROXINE: CPT

## 2020-11-11 PROCEDURE — 85379 FIBRIN DEGRADATION QUANT: CPT

## 2020-11-11 PROCEDURE — 83735 ASSAY OF MAGNESIUM: CPT

## 2020-11-11 PROCEDURE — 36415 COLL VENOUS BLD VENIPUNCTURE: CPT

## 2020-11-11 PROCEDURE — 84480 ASSAY TRIIODOTHYRONINE (T3): CPT

## 2020-11-11 PROCEDURE — 80048 BASIC METABOLIC PNL TOTAL CA: CPT

## 2020-11-11 PROCEDURE — 85025 COMPLETE CBC W/AUTO DIFF WBC: CPT

## 2020-11-11 PROCEDURE — 80061 LIPID PANEL: CPT

## 2020-11-11 PROCEDURE — 84460 ALANINE AMINO (ALT) (SGPT): CPT

## 2020-12-07 ENCOUNTER — HOSPITAL ENCOUNTER (OUTPATIENT)
Age: 55
Setting detail: SPECIMEN
Discharge: HOME | End: 2020-12-07
Payer: COMMERCIAL

## 2020-12-08 ENCOUNTER — HOSPITAL ENCOUNTER (OUTPATIENT)
Dept: WOUND CARE | Age: 55
Discharge: HOME | End: 2020-12-08
Payer: COMMERCIAL

## 2020-12-08 VITALS
SYSTOLIC BLOOD PRESSURE: 142 MMHG | RESPIRATION RATE: 20 BRPM | WEIGHT: 280 LBS | HEIGHT: 66 IN | DIASTOLIC BLOOD PRESSURE: 68 MMHG | BODY MASS INDEX: 45 KG/M2 | TEMPERATURE: 96.8 F | HEART RATE: 66 BPM

## 2020-12-08 PROBLEM — R12 HEARTBURN: Status: ACTIVE | Noted: 2020-07-08

## 2020-12-08 PROBLEM — S31.109A OPEN ABDOMINAL WALL WOUND: Status: ACTIVE | Noted: 2020-12-08

## 2020-12-08 PROBLEM — E78.5 HYPERLIPIDEMIA: Status: ACTIVE | Noted: 2020-07-08

## 2020-12-08 PROBLEM — E66.01 MORBID OBESITY (HCC): Status: ACTIVE | Noted: 2020-07-08

## 2020-12-08 PROBLEM — I10 HYPERTENSIVE DISORDER: Status: ACTIVE | Noted: 2020-07-08

## 2020-12-08 PROBLEM — E56.9 VITAMIN DEFICIENCY: Status: ACTIVE | Noted: 2020-07-08

## 2020-12-08 PROBLEM — L03.311 CELLULITIS OF LEFT ABDOMINAL WALL: Status: ACTIVE | Noted: 2020-12-08

## 2020-12-08 PROCEDURE — 87075 CULTR BACTERIA EXCEPT BLOOD: CPT

## 2020-12-08 PROCEDURE — 99203 OFFICE O/P NEW LOW 30 MIN: CPT | Performed by: NURSE PRACTITIONER

## 2020-12-08 PROCEDURE — 87176 TISSUE HOMOGENIZATION CULTR: CPT

## 2020-12-08 PROCEDURE — 11042 DBRDMT SUBQ TIS 1ST 20SQCM/<: CPT | Performed by: NURSE PRACTITIONER

## 2020-12-08 PROCEDURE — 87205 SMEAR GRAM STAIN: CPT

## 2020-12-08 PROCEDURE — 86403 PARTICLE AGGLUT ANTBDY SCRN: CPT

## 2020-12-08 PROCEDURE — 99213 OFFICE O/P EST LOW 20 MIN: CPT

## 2020-12-08 PROCEDURE — 87070 CULTURE OTHR SPECIMN AEROBIC: CPT

## 2020-12-08 PROCEDURE — 11042 DBRDMT SUBQ TIS 1ST 20SQCM/<: CPT

## 2020-12-08 PROCEDURE — 87186 SC STD MICRODIL/AGAR DIL: CPT

## 2020-12-08 RX ORDER — LIDOCAINE 40 MG/G
CREAM TOPICAL ONCE
Status: CANCELLED | OUTPATIENT
Start: 2020-12-08

## 2020-12-08 RX ORDER — LIDOCAINE HYDROCHLORIDE 20 MG/ML
JELLY TOPICAL ONCE
Status: CANCELLED | OUTPATIENT
Start: 2020-12-08

## 2020-12-08 RX ORDER — LIDOCAINE HYDROCHLORIDE 40 MG/ML
SOLUTION TOPICAL ONCE
Status: DISCONTINUED | OUTPATIENT
Start: 2020-12-08 | End: 2020-12-09 | Stop reason: HOSPADM

## 2020-12-08 RX ORDER — LIDOCAINE 50 MG/G
OINTMENT TOPICAL ONCE
Status: CANCELLED | OUTPATIENT
Start: 2020-12-08

## 2020-12-08 RX ORDER — LIDOCAINE HYDROCHLORIDE 40 MG/ML
SOLUTION TOPICAL ONCE
Status: CANCELLED | OUTPATIENT
Start: 2020-12-08

## 2020-12-08 ASSESSMENT — PAIN DESCRIPTION - DESCRIPTORS: DESCRIPTORS: SHARP

## 2020-12-08 ASSESSMENT — PAIN DESCRIPTION - ORIENTATION: ORIENTATION: LEFT

## 2020-12-08 ASSESSMENT — PAIN DESCRIPTION - LOCATION: LOCATION: ABDOMEN

## 2020-12-08 ASSESSMENT — PAIN SCALES - GENERAL: PAINLEVEL_OUTOF10: 5

## 2020-12-08 ASSESSMENT — PAIN DESCRIPTION - ONSET: ONSET: ON-GOING

## 2020-12-08 ASSESSMENT — PAIN DESCRIPTION - PROGRESSION: CLINICAL_PROGRESSION: NOT CHANGED

## 2020-12-08 ASSESSMENT — PAIN DESCRIPTION - PAIN TYPE: TYPE: ACUTE PAIN

## 2020-12-08 ASSESSMENT — PAIN DESCRIPTION - FREQUENCY: FREQUENCY: CONTINUOUS

## 2020-12-08 NOTE — PROGRESS NOTES
[address] [facility],3Rd Floor:     101 Dates  [address] [facility] p: [phone] f: [facility], [facility],5Th Floor:     [address] [facility] [address]  [address]  WOUND CARE Dept: [address] [place] [address]  [phone]    Patient Information:      Brian Guerrero  [address] [facility],6Th Floor   [phone]   : 1965  AGE: 50 y.o. GENDER: female   EPISODE DATE: 2020    Insurance:      PRIMARY INSURANCE:  Plan: [facility] MEDICAID  Coverage: [employer]  Effective Date: 3/1/2020  Group Number: [unfilled]  Subscriber Number: [id number] - (Medicaid Managed)    Payor/Plan Subscr  Sex Relation Sub. Ins. ID Effective Group Num   1. [employer] - * ANEUDY YOUNG BELKYS 1965 Female Self [id number] 3/1/20 [facility] [address]       Patient Wound Information:      Problem List Items Addressed This Visit     None          WOUNDS REQUIRING DRESSING SUPPLIES:     Incision 17 Hand Right (Active)   Number of days: 6338       Wound 20 Abdomen Left; Lower wound #1 left lower abdomen (Active)   Wound Image   20 1403   Wound Etiology Other 20 1403   Dressing Status New drainage noted; Old drainage noted 20 1403   Wound Cleansed Irrigated with saline 20 1403   Wound Length (cm) 1.3 cm 20 1403   Wound Width (cm) 2.2 cm 20 1403   Wound Depth (cm) 0.3 cm 20 1403   Wound Surface Area (cm^2) 2.86 cm^2 20 1403   Wound Volume (cm^3) 0.86 cm^3 20 1403   Post-Procedure Length (cm) 1.3 cm 20 1403   Post-Procedure Width (cm) 2.2 cm 20 1403   Post-Procedure Depth (cm) 0.3 cm 20 1403   Post-Procedure Surface Area (cm^2) 2.86 cm^2 20 1403   Post-Procedure Volume (cm^3) 0.86 cm^3 20 1403   Wound Assessment Granulation tissue;Pink/red;Slough 20 1403   Drainage Amount Moderate 20 1403   Drainage Description Serosanguinous; Yellow 12/08/20 1403   Odor None 12/08/20 1403   Shwetha-wound Assessment Blanchable erythema; Warm;Other (Comment) 12/08/20 1403   Number of days: 0          Supplies Requested :      WOUND #: 1   PRIMARY DRESSING:  Collagen with silver-JUAN   Cover and Secure with: 4X4 gauze pad and mefix tape     FREQUENCY OF DRESSING CHANGES:  Daily       ADDITIONAL ITEMS:  [] Gloves Small  [x] Gloves Medium [x] Gloves Large [] Gloves XLarge  [] Tape 1\" [] Tape 2\" [] Tape 3\"  [] Medipore Tape  [] Saline  [] Skin Prep   [] Adhesive Remover   [] Cotton Tip Applicators   [x] Other:MEFIX TAPE  Patient Wound(s) Debrided: [x] Yes if yes please add date 12/8/20  [] No    Debribement Type: Excisional/Sharp    Patient currently being seen by Home Health: [] Yes   [x] No    Duration for needed supplies:  []15  [x]30  []60  []90 Days    Electronically signed by Belia Guerrier RN on 12/8/2020 at 2:51 PM     Provider Information:      PROVIDER'S NAME: Wily CARLSON-ANUJ    [id number]

## 2020-12-08 NOTE — PROGRESS NOTES
[address]   Progress Note and Procedure Note      [address] [facility] RECORD NUMBER:  [id number]  AGE: 50 y.o. GENDER: female  : 1965  EPISODE DATE:  2020    Subjective:     Chief Complaint   Patient presents with    Wound Check     left lower abdomen         HISTORY of PRESENT ILLNESS HPI     Michel Light is a 50 y.o. female who presents today for wound/ulcer evaluation. History of Wound Context: presents to wound clinic today for evaluation of left abdominal wall wound with surrounding cellulitis. Wound has been present for last 3 weeks after what she thinks was a spider bite but does not know for sure.  Was treated at urgent care and did complete course of clindamycin   Wound/Ulcer Pain Timing/Severity: constant  Quality of pain: aching  Severity:  3 / 10   Modifying Factors: Pain worsens with touching  Associated Signs/Symptoms: edema, erythema and drainage    Ulcer Identification:  Ulcer Type: non-healing/non-surgical  Contributing Factors: obesity    Wound: N/A        PAST MEDICAL HISTORY        Diagnosis Date    Hyperlipidemia     Hypertension     Ulcer        PAST SURGICAL HISTORY    Past Surgical History:   Procedure Laterality Date    DENTAL SURGERY      [place] INCISE FINGER TENDON SHEATH Right 2017    RELEASE A-PULLEY THUMB  performed by Memo Madden MD at [address] [place] [address]    Family History   Problem Relation Age of Onset    Heart Disease Mother     Heart Disease Maternal Grandmother     Heart Disease Maternal Grandfather        SOCIAL HISTORY    Social History     Tobacco Use    Smoking status: Never Smoker    Smokeless tobacco: Never Used   Substance Use Topics    Alcohol use: No    Drug use: No       ALLERGIES    Allergies   Allergen Reactions    Asa [Aspirin]      Due to history of ulcers    Ibuprofen Other (See Comments)     Abdominal cramping        MEDICATIONS    Current Outpatient Medications on File Prior to Encounter   Medication Sig Dispense Refill    metoprolol succinate (TOPROL XL) 50 MG extended release tablet Take 1 tablet by mouth daily 30 tablet 5    potassium chloride (KLOR-CON M) 20 MEQ TBCR extended release tablet Take 1 tablet by mouth daily (with breakfast) 30 tablet 5    atorvastatin (LIPITOR) 40 MG tablet Take 40 mg by mouth daily      lisinopril-hydroCHLOROthiazide (PRINZIDE;ZESTORETIC) 20-25 MG per tablet Take 0.5 tablets by mouth daily 30 tablet      No current facility-administered medications on file prior to encounter.         REVIEW OF SYSTEMS    Constitutional: negative  Eyes: negative  Ears, nose, mouth, throat, and face: negative  Respiratory: negative  Cardiovascular: negative  Gastrointestinal: negative  Genitourinary:negative  Integument/breast: negative except for abdominal wall wound  Hematologic/lymphatic: negative  Musculoskeletal:negative  Neurological: negative  Behavioral/Psych: negative  Endocrine: negative  Allergic/Immunologic: negative    Objective:      BP (!) 142/68   Pulse 66   Temp 96.8 °F (36 °C) (Tympanic)   Resp 20   Ht 5' 6\" (1.676 m)   Wt 280 lb (127 kg)   BMI 45.19 kg/m²     Wt Readings from Last 3 Encounters:   12/08/20 280 lb (127 kg)   12/01/20 280 lb (127 kg)   11/09/20 284 lb (128.8 kg)       PHYSICAL EXAM    General Appearance: alert and oriented to person, place and time, well developed and obese, in no acute distress  Skin: warm and dry, no rash or erythema, abdominal wound   Head: normocephalic and atraumatic  Eyes: pupils equal, round, extraocular eye movements intact, and conjunctivae normal  Pulmonary/Chest: clear to auscultation bilaterally- no wheezes, rales or rhonchi, normal air movement, no respiratory distress  Cardiovascular: normal rate, regular rhythm, normal S1 and S2, no murmurs, rubs, clicks, or gallops  Abdomen: soft, non-tender, non-distended, normal bowel sounds  Extremities: no cyanosis, clubbing or edema  Musculoskeletal: no joint swelling, deformity or tenderness  Neurologic: gait, coordination and speech normal      Assessment:     Problem List Items Addressed This Visit     Morbid obesity ([address]    Relevant Medications    lidocaine (XYLOCAINE) 4 % external solution (Start on 12/8/2020  3:30 PM)    Other Relevant Orders    Supply: Wound Cleanser    Supply: Wound Dressings    Supply: Pack Wound    Supply: Cover and Secure    Open abdominal wall wound - Primary    Relevant Medications    lidocaine (XYLOCAINE) 4 % external solution (Start on 12/8/2020  3:30 PM)    Other Relevant Orders    Supply: Wound Cleanser    Supply: Wound Dressings    Supply: Pack Wound    Supply: Cover and Secure    Cellulitis of left abdominal wall    Relevant Medications    lidocaine (XYLOCAINE) 4 % external solution (Start on 12/8/2020  3:30 PM)    Other Relevant Orders    Supply: Wound Cleanser    Supply: Wound Dressings    Supply: Pack Wound    Supply: Cover and Secure           Procedure Note  Indications:  Based on my examination of this patient's wound(s)/ulcer(s) today, debridement is required to promote healing and evaluate the wound base. Performed by: ROBYN Payne CNP    Consent obtained:  Yes    Time out taken:  Yes    Pain Control: Anesthetic  Anesthetic: 4% Lidocaine Liquid Topical       Debridement:Excisional Debridement    Using curette the wound(s)/ulcer(s) was/were sharply debrided down through and including the removal of subcutaneous tissue. Devitalized Tissue Debrided:  fibrin, biofilm, slough and exudate    Pre Debridement Measurements:  Are located in the [facility]  Documentation Flow Sheet    Wound/Ulcer #: 1    Post Debridement Measurements:  Wound/Ulcer Descriptions are Pre Debridement except measurements:    Incision 02/23/17 Hand Right (Active)   Number of days: 6145       Wound 12/08/20 Abdomen Left; Lower wound #1 left lower abdomen (Active)   Wound Image   12/08/20 1403   Wound Etiology Other 12/08/20 1403   Dressing Status New drainage noted; Old drainage noted 12/08/20 1403   Wound Cleansed Irrigated with saline 12/08/20 1403   Wound Length (cm) 1.3 cm 12/08/20 1403   Wound Width (cm) 2.2 cm 12/08/20 1403   Wound Depth (cm) 0.3 cm 12/08/20 1403   Wound Surface Area (cm^2) 2.86 cm^2 12/08/20 1403   Wound Volume (cm^3) 0.86 cm^3 12/08/20 1403   Post-Procedure Length (cm) 1.3 cm 12/08/20 1403   Post-Procedure Width (cm) 2.2 cm 12/08/20 1403   Post-Procedure Depth (cm) 0.3 cm 12/08/20 1403   Post-Procedure Surface Area (cm^2) 2.86 cm^2 12/08/20 1403   Post-Procedure Volume (cm^3) 0.86 cm^3 12/08/20 1403   Wound Assessment Granulation tissue;Pink/red;Slough 12/08/20 1403   Drainage Amount Moderate 12/08/20 1403   Drainage Description Serosanguinous; Yellow 12/08/20 1403   Odor None 12/08/20 1403   Shwetha-wound Assessment Blanchable erythema; Warm;Other (Comment) 12/08/20 1403   Number of days: 0          Percent of Wound(s)/Ulcer(s) Debrided: 100%    Total Surface Area Debrided:  2.86 sq cm       Diabetic/Pressure/Non Pressure Ulcers only:  Ulcer: Non-Pressure ulcer, fat layer exposed      Estimated Blood Loss:  None    Hemostasis Achieved:  not needed    Procedural Pain:  3  / 10     Post Procedural Pain:  0 / 10     Response to treatment:  Well tolerated by patient. Plan:     Treatment Note please see attached Discharge Instructions    Written patient dismissal instructions given to patient and signed by patient or POA.          Discharge Instructions         [id number]      Visit  Discharge Instructions / Physician Orders  DATE:12/8/20     Home Care:     SUPPLIES ORDERED THRU:     Wound Location: Abdomen      Cleanse with: Liquid antibacterial soap and water, rinse well      Dressing Orders:  Primary dressing  Silvercel until gone then start whitney      Secondary dressing   Dry gauze Secure with mefix tape                           Frequency: Daily Additional Orders: Increase protein to diet (meat, cheese, eggs, fish, peanut butter, nuts and beans)  Multivitamin daily    MY CHART []     Smart Device  []     HYPERBARIC TREATMENT-                TREATMENT #     Your next appointment with the [address] [place] [address] is in 1 week                                                                                                   ROOM TYPE   [] CHAIR     [] BED   [] EITHER        TIME [] 45 MIN     [] 60 MIN     (Please note your next appointment above and if you are unable to keep, kindly give a 24 hour notice. Thank you.)     If you experience any of the following, please call the [address] [place] [address] during business hours:  [phone]     * Increase in Pain  * Temperature over 101  * Increase in drainage from your wound  * Drainage with a foul odor  * Bleeding  * Increase in swelling  * Need for compression bandage changes due to slippage, breakthrough drainage. If you need medical attention outside of the business hours of the [address] [place] [address] please contact your PCP or go to the nearest emergency room. The information contained in the After Visit Summary has been reviewed with me, the patient and/or responsible adult, by my health care provider(s). I had the opportunity to ask questions regarding this information.  I have elected to receive;      []After Visit Summary  [x]Comprehensive Discharge Instruction      Patient signature______________________________________Date:________    Electronically signed by Ismael Ruiz RN on 12/8/2020 at 2:48 PM  Electronically signed by ROBYN Arias CNP on 12/8/2020 at 2:49 PM          Electronically signed by ROBYN Arias CNP on 12/8/2020 at 3:02 PM

## 2020-12-08 NOTE — PLAN OF CARE
Problem: Pain:  Goal: Pain level will decrease  Description: Pain level will decrease  12/8/2020 1450 by Jennifer Tripathi RN  Outcome: Ongoing  12/8/2020 1450 by Jennifer Tripathi RN  Outcome: Ongoing  Goal: Control of acute pain  Description: Control of acute pain  12/8/2020 1450 by Jennifer Tripathi RN  Outcome: Ongoing  12/8/2020 1450 by Jennifer Tripathi RN  Outcome: Ongoing  Goal: Control of chronic pain  Description: Control of chronic pain  12/8/2020 1450 by Jennifer Tripathi RN  Outcome: Ongoing  12/8/2020 1450 by Jennifer Tripathi RN  Outcome: Ongoing

## 2020-12-09 LAB
CULTURE: ABNORMAL
DIRECT EXAM: ABNORMAL
DIRECT EXAM: ABNORMAL
Lab: ABNORMAL
SPECIMEN DESCRIPTION: ABNORMAL

## 2020-12-10 LAB
CULTURE: ABNORMAL
CULTURE: ABNORMAL
DIRECT EXAM: ABNORMAL
DIRECT EXAM: ABNORMAL
Lab: ABNORMAL
SPECIMEN DESCRIPTION: ABNORMAL

## 2020-12-11 PROBLEM — I49.8: Status: ACTIVE | Noted: 2020-11-09

## 2020-12-11 RX ORDER — DOXYCYCLINE HYCLATE 100 MG
100 TABLET ORAL 2 TIMES DAILY
Qty: 14 TABLET | Refills: 0 | Status: CANCELLED | OUTPATIENT
Start: 2020-12-11 | End: 2020-12-18

## 2020-12-16 ENCOUNTER — HOSPITAL ENCOUNTER (OUTPATIENT)
Dept: WOUND CARE | Age: 55
Discharge: HOME | End: 2020-12-16
Payer: COMMERCIAL

## 2020-12-16 VITALS
HEIGHT: 66 IN | DIASTOLIC BLOOD PRESSURE: 69 MMHG | WEIGHT: 280 LBS | TEMPERATURE: 97.5 F | SYSTOLIC BLOOD PRESSURE: 117 MMHG | HEART RATE: 63 BPM | RESPIRATION RATE: 20 BRPM | BODY MASS INDEX: 45 KG/M2

## 2020-12-16 PROBLEM — E78.5 HYPERLIPIDEMIA: Chronic | Status: ACTIVE | Noted: 2020-07-08

## 2020-12-16 PROBLEM — R12 HEARTBURN: Chronic | Status: ACTIVE | Noted: 2020-07-08

## 2020-12-16 PROBLEM — S31.109A OPEN ABDOMINAL WALL WOUND: Chronic | Status: ACTIVE | Noted: 2020-12-08

## 2020-12-16 PROBLEM — I10 HYPERTENSIVE DISORDER: Chronic | Status: ACTIVE | Noted: 2020-07-08

## 2020-12-16 PROBLEM — E66.01 MORBID OBESITY (HCC): Chronic | Status: ACTIVE | Noted: 2020-07-08

## 2020-12-16 PROCEDURE — 11042 DBRDMT SUBQ TIS 1ST 20SQCM/<: CPT

## 2020-12-16 RX ORDER — LIDOCAINE 50 MG/G
OINTMENT TOPICAL ONCE
Status: CANCELLED | OUTPATIENT
Start: 2020-12-16 | End: 2020-12-16

## 2020-12-16 RX ORDER — SULFAMETHOXAZOLE AND TRIMETHOPRIM 800; 160 MG/1; MG/1
1 TABLET ORAL 2 TIMES DAILY
COMMUNITY
Start: 2020-12-10 | End: 2021-01-13 | Stop reason: ALTCHOICE

## 2020-12-16 RX ORDER — LIDOCAINE HYDROCHLORIDE 20 MG/ML
JELLY TOPICAL ONCE
Status: CANCELLED | OUTPATIENT
Start: 2020-12-16 | End: 2020-12-16

## 2020-12-16 RX ORDER — LIDOCAINE 40 MG/G
CREAM TOPICAL ONCE
Status: CANCELLED | OUTPATIENT
Start: 2020-12-16 | End: 2020-12-16

## 2020-12-16 RX ORDER — LIDOCAINE HYDROCHLORIDE 40 MG/ML
SOLUTION TOPICAL ONCE
Status: COMPLETED | OUTPATIENT
Start: 2020-12-16 | End: 2020-12-16

## 2020-12-16 RX ORDER — LIDOCAINE HYDROCHLORIDE 40 MG/ML
SOLUTION TOPICAL ONCE
Status: CANCELLED | OUTPATIENT
Start: 2020-12-16 | End: 2020-12-16

## 2020-12-16 RX ADMIN — LIDOCAINE HYDROCHLORIDE: 40 SOLUTION TOPICAL at 09:05

## 2020-12-16 ASSESSMENT — PAIN SCALES - GENERAL: PAINLEVEL_OUTOF10: 2

## 2020-12-16 ASSESSMENT — PAIN DESCRIPTION - ONSET: ONSET: ON-GOING

## 2020-12-16 ASSESSMENT — PAIN DESCRIPTION - PAIN TYPE: TYPE: CHRONIC PAIN

## 2020-12-16 ASSESSMENT — PAIN DESCRIPTION - PROGRESSION: CLINICAL_PROGRESSION: NOT CHANGED

## 2020-12-16 ASSESSMENT — PAIN DESCRIPTION - LOCATION: LOCATION: ABDOMEN

## 2020-12-16 ASSESSMENT — PAIN DESCRIPTION - DESCRIPTORS: DESCRIPTORS: BURNING

## 2020-12-16 ASSESSMENT — PAIN DESCRIPTION - FREQUENCY: FREQUENCY: INTERMITTENT

## 2020-12-16 NOTE — DISCHARGE INSTR - COC
Continuity of Care Form    Patient Name: Candida Mercado   :  1965  MRN:  [id number]    Admit date:  2020  Discharge date:  ***    Code Status Order: No Order   Advance Directives:   [facility] Documentation     Date/Time Healthcare Directive Type of Healthcare Directive Copy in [address] Agent's Name Healthcare Agent's Phone Number    20 0919  No, patient does not have an advance directive for healthcare treatment -- -- -- -- --          Admitting Physician:  No admitting provider for patient encounter. PCP: Edie Burciaga MD    Discharging Nurse: [facility] Unit/Room#: No information available for this encounter. Discharging Unit Phone Number: ***    Emergency Contact:   Extended Emergency Contact Information  Primary Emergency Contact: [address] Phone: [phone]  Mobile Phone: [phone]  Relation: Child    Past Surgical History:  Past Surgical History:   Procedure Laterality Date    DENTAL SURGERY      [place] INCISE FINGER TENDON SHEATH Right 2017    RELEASE A-PULLEY THUMB  performed by Brandon Roblero MD at [address] [place] [address]         Immunization History: There is no immunization history on file for this patient.     Active Problems:  Patient Active Problem List   Diagnosis Code    Trigger finger of right thumb M65.311    Vitamin deficiency E56.9    Morbid obesity ([facility] [address] E66.01    Hypertensive disorder I10    Hyperlipidemia E78.5    Heartburn R12    Open abdominal wall wound S31.109A    Cellulitis of left abdominal wall L03.311    Cardiac fibrillation I49.8       Isolation/Infection:   Isolation          No Isolation        Patient Infection Status     Infection Onset Added Last Indicated Last Indicated By Review Planned Expiration Resolved Resolved By    MRSA 20 Culture, Tissue        Abdomen tissue 2020          Nurse Assessment:  Last Vital Signs: /69   Pulse 63   Temp 97.5 °F (36.4 °C) (Tympanic)   Resp 20   Ht 5' 6\" (1.676 m)   Wt 280 lb (127 kg)   BMI 45.19 kg/m²     Last documented pain score (0-10 scale): Pain Level: 2  Last Weight:   Wt Readings from Last 1 Encounters:   12/16/20 280 lb (127 kg)     Mental Status:  {IP PT MENTAL STATUS:42888}    IV Access:  { PAULETTE IV ACCESS:763691189}    Nursing Mobility/ADLs:  Walking   {CHP DME LSKH:100191972}  Transfer  {CHP DME QXSH:260888362}  Bathing  {CHP DME BHVJ:408144624}  Dressing  {CHP DME YQRP:948003516}  Toileting  {CHP DME LARS:123480392}  Feeding  {CHP DME IZAU:923397328}  Med Admin  {P DME BOVP:381881570}  Med Delivery   { PAULETTE MED Delivery:651770232}    Wound Care Documentation and Therapy:  Incision 02/23/17 Hand Right (Active)   Number of days: 8457       Wound 12/08/20 Abdomen Left; Lower wound #1 left lower abdomen (Active)   Wound Image   12/08/20 1403   Wound Etiology Other 12/08/20 1403   Dressing Status New drainage noted; Old drainage noted 12/16/20 0918   Wound Cleansed Irrigated with saline 12/16/20 0918   Wound Length (cm) 1 cm 12/16/20 0918   Wound Width (cm) 1.5 cm 12/16/20 0918   Wound Depth (cm) 0.1 cm 12/16/20 0918   Wound Surface Area (cm^2) 1.5 cm^2 12/16/20 0918   Change in Wound Size % (l*w) 47.55 12/16/20 0918   Wound Volume (cm^3) 0.15 cm^3 12/16/20 0918   Wound Healing % 83 12/16/20 0918   Post-Procedure Length (cm) 1 cm 12/16/20 0918   Post-Procedure Width (cm) 1.5 cm 12/16/20 0918   Post-Procedure Depth (cm) 0.1 cm 12/16/20 0918   Post-Procedure Surface Area (cm^2) 1.5 cm^2 12/16/20 0918   Post-Procedure Volume (cm^3) 0.15 cm^3 12/16/20 0918   Wound Assessment Granulation tissue 12/16/20 0918   Drainage Amount Moderate 12/16/20 0918   Drainage Description Serosanguinous; Yellow 12/16/20 0918   Odor None 12/16/20 0918   Shwetha-wound Assessment Blanchable erythema 12/16/20 0918   Number of days: 7        Elimination:  Continence: · Bowel: {YES / LW:04329}  · Bladder: {YES / DY:06513}  Urinary Catheter: {Urinary Catheter:517117060}   Colostomy/Ileostomy/Ileal Conduit: {YES / PP:95375}       Date of Last BM: ***  No intake or output data in the 24 hours ending 20 0937  No intake/output data recorded.     Safety Concerns:     [address] [facility] Safety Concerns:286312658}    Impairments/Disabilities:      [address] [place] [address] Impairments/Disabilities:224834986}    Nutrition Therapy:  Current Nutrition Therapy:   [facility] Diet List:604909031}    Routes of Feeding: {CHP DME Other Feedings:290696520}  Liquids: {Slp liquid thickness:51442}  Daily Fluid Restriction: {CHP DME Yes amt example:578019240}  Last Modified Barium Swallow with Video (Video Swallowing Test): {Done Not Done QJYD:349941247}    Treatments at the Time of Hospital Discharge:   Respiratory Treatments: ***  Oxygen Therapy:  {Therapy; copd oxygen:18094}  Ventilator:    { CC Vent WYOU:573062276}    Rehab Therapies: {THERAPEUTIC INTERVENTION:6751168181}  Weight Bearing Status/Restrictions: [address] [place] [address] Weight Bearin}  Other Medical Equipment (for information only, NOT a DME order):  {EQUIPMENT:166932549}  Other Treatments: ***    Patient's personal belongings (please select all that are sent with patient):  {P DME Belongings:378125628}    RN SIGNATURE:  {Esignature:328726256}    CASE MANAGEMENT/SOCIAL WORK SECTION    Inpatient Status Date: ***    Readmission Risk Assessment Score:  Readmission Risk              Risk of Unplanned Readmission:        0           Discharging to Facility/ Agency   · Name:   · Address:  · Phone:  · Fax:    Dialysis Facility (if applicable)   · Name:  · Address:  · Dialysis Schedule:  · Phone:  · Fax:    / signature: {Esignature:795717077}    PHYSICIAN SECTION    Prognosis: {Prognosis:1957889188}    Condition at Discharge: [address] Patient Condition:250054319}    Rehab Potential (if transferring to Rehab): {Prognosis:7625665565}    Recommended Labs or Other Treatments After Discharge: ***    Physician Certification: I certify the above information and transfer of Nelson Fowler  is necessary for the continuing treatment of the diagnosis listed and that she requires {Admit to Appropriate Level of Care:74814} for {GREATER/LESS:212830942} 30 days.      Update Admission H&P: {CHP DME Changes in BYRKP:140634281}    PHYSICIAN SIGNATURE:  {Esignature:011173638}

## 2020-12-16 NOTE — PROGRESS NOTES
[address]   Progress Note and Procedure Note      [address] [facility] RECORD NUMBER:  [id number]  AGE: 50 y.o. GENDER: female  : 1965  EPISODE DATE:  2020    Subjective:     Chief Complaint   Patient presents with    Wound Check     abdomen left mid         HISTORY of PRESENT ILLNESS NUNU Schultz is a 50 y.o. female who presents today for wound/ulcer evaluation. History of Wound Context: presents to wound clinic today for evaluation of left abdominal wall wound with surrounding cellulitis. Wound has been present for last 3 weeks after what she thinks was a spider bite but does not know for sure.  Was treated at urgent care and did complete course of clindamycin   Wound/Ulcer Pain Timing/Severity: constant  Quality of pain: aching  Severity:  3 / 10   Modifying Factors: Pain worsens with touching  Associated Signs/Symptoms: edema, erythema and drainage     Ulcer Identification:  Ulcer Type: non-healing/non-surgical  Contributing Factors: obesity       Wound: N/A        PAST MEDICAL HISTORY        Diagnosis Date    Hyperlipidemia     Hypertension     Ulcer        PAST SURGICAL HISTORY    Past Surgical History:   Procedure Laterality Date    DENTAL SURGERY      [place] INCISE FINGER TENDON SHEATH Right 2017    RELEASE A-PULLEY THUMB  performed by Neale Councilman, MD at [address] [place] [address]    Family History   Problem Relation Age of Onset    Heart Disease Mother     Heart Disease Maternal Grandmother     Heart Disease Maternal Grandfather        SOCIAL HISTORY    Social History     Tobacco Use    Smoking status: Never Smoker    Smokeless tobacco: Never Used   Substance Use Topics    Alcohol use: No    Drug use: No       ALLERGIES    Allergies   Allergen Reactions    Asa [Aspirin]      Due to history of ulcers    Ibuprofen Other (See Comments)     Abdominal cramping        MEDICATIONS    Current Outpatient Medications on File Prior to Encounter   Medication Sig Dispense Refill    Sulfamethoxazole-Trimethoprim (BACTRIM DS PO) Take by mouth      sulfamethoxazole-trimethoprim (BACTRIM DS;SEPTRA DS) 800-160 MG per tablet Take 1 tablet by mouth 2 times daily      metoprolol succinate (TOPROL XL) 50 MG extended release tablet Take 1 tablet by mouth daily 30 tablet 5    potassium chloride (KLOR-CON M) 20 MEQ TBCR extended release tablet Take 1 tablet by mouth daily (with breakfast) 30 tablet 5    atorvastatin (LIPITOR) 40 MG tablet Take 40 mg by mouth daily      lisinopril-hydroCHLOROthiazide (PRINZIDE;ZESTORETIC) 20-25 MG per tablet Take 0.5 tablets by mouth daily 30 tablet      No current facility-administered medications on file prior to encounter. REVIEW OF SYSTEMS    Pertinent items are noted in HPI.     Objective:      /69   Pulse 63   Temp 97.5 °F (36.4 °C) (Tympanic)   Resp 20   Ht 5' 6\" (1.676 m)   Wt 280 lb (127 kg)   BMI 45.19 kg/m²     Wt Readings from Last 3 Encounters:   12/16/20 280 lb (127 kg)   12/08/20 280 lb (127 kg)   12/01/20 280 lb (127 kg)       PHYSICAL EXAM    General Appearance: alert and oriented to person, place and time, well developed and morbidly obese, in no acute distress  Skin: warm and dry, no rash or erythema  Head: normocephalic and atraumatic  Eyes: pupils equal, round, and reactive to light, extraocular eye movements intact, conjunctivae normal  Pulmonary/Chest: clear to auscultation bilaterally- no wheezes, rales or rhonchi, normal air movement, no respiratory distress  Cardiovascular: normal rate, regular rhythm, normal S1 and S2, no murmurs, rubs, clicks, or gallops, distal pulses intact, no carotid bruits  Abdomen: soft, non-tender, non-distended, normal bowel sounds, no masses or organomegaly  Extremities: no cyanosis, clubbing or edema  Musculoskeletal: normal range of motion, no joint swelling, deformity or tenderness  Neurologic: reflexes normal and symmetric, no cranial nerve deficit, gait, coordination and speech normal      Assessment:     Problem List Items Addressed This Visit     Morbid obesity ([address] (Chronic)    Relevant Orders    Supply: Wound Cleanser    Open abdominal wall wound - Primary (Chronic)    Relevant Orders    Supply: Wound Cleanser    Cellulitis of left abdominal wall    Relevant Orders    Supply: Wound Cleanser           Procedure Note  Indications:  Based on my examination of this patient's wound(s)/ulcer(s) today, debridement is required to promote healing and evaluate the wound base. Performed by: Sharron Grant MD    Consent obtained:  Yes    Time out taken:  Yes    Pain Control: Anesthetic  Anesthetic: 4% Lidocaine Liquid Topical       Debridement:Excisional Debridement    Using curette the wound(s)/ulcer(s) was/were sharply debrided down through and including the removal of subcutaneous tissue. Devitalized Tissue Debrided:  fibrin, biofilm and slough    Pre Debridement Measurements:  Are located in the [place]  Documentation Flow Sheet    Wound/Ulcer #: 1    Post Debridement Measurements:  Wound/Ulcer Descriptions are Pre Debridement except measurements:        Incision 02/23/17 Hand Right (Active)   Number of days: 1391       Wound 12/08/20 Abdomen Left; Lower wound #1 left lower abdomen (Active)   Wound Image   12/08/20 1403   Wound Etiology Other 12/08/20 1403   Dressing Status New drainage noted; Old drainage noted 12/16/20 0918   Wound Cleansed Irrigated with saline 12/16/20 0918   Wound Length (cm) 1 cm 12/16/20 0918   Wound Width (cm) 1.5 cm 12/16/20 0918   Wound Depth (cm) 0.1 cm 12/16/20 0918   Wound Surface Area (cm^2) 1.5 cm^2 12/16/20 0918   Change in Wound Size % (l*w) 47.55 12/16/20 0918   Wound Volume (cm^3) 0.15 cm^3 12/16/20 0918   Wound Healing % 83 12/16/20 0918   Post-Procedure Length (cm) 1 cm 12/16/20 0918   Post-Procedure Width (cm) 1.5 cm 12/16/20 0918   Post-Procedure Depth (cm) 0.1 cm 12/16/20 0918   Post-Procedure Surface Area (cm^2) 1.5 cm^2 12/16/20 0918   Post-Procedure Volume (cm^3) 0.15 cm^3 12/16/20 0918   Wound Assessment Granulation tissue 12/16/20 0918   Drainage Amount Moderate 12/16/20 0918   Drainage Description Serosanguinous; Yellow 12/16/20 0918   Odor None 12/16/20 0918   Shwetha-wound Assessment Blanchable erythema 12/16/20 0918   Number of days: 7          Percent of Wound(s)/Ulcer(s) Debrided: 100%    Total Surface Area Debrided:  1.5 sq cm       Diabetic/Pressure/Non Pressure Ulcers only:  Ulcer: Non-Pressure ulcer, fat layer exposed      Estimated Blood Loss:  Minimal    Hemostasis Achieved:  by pressure    Procedural Pain:  1  / 10     Post Procedural Pain:  1 / 10     Response to treatment:  Well tolerated by patient. Plan:     Treatment Note please see attached Discharge Instructions    Written patient dismissal instructions given to patient and signed by patient or POA.          Discharge Instructions         [facility] [place] and [facility]                                 Visit  Discharge Instructions / Physician Orders  DATE:12/16/20     Home Care:     SUPPLIES ORDERED THRU:     Wound Location: Abdomen      Cleanse with: Liquid antibacterial soap and water, rinse well      Dressing Orders:  Primary dressing  Steph      Secondary dressing   Dry gauze Secure with mefix tape                           Frequency: Daily      Additional Orders: Increase protein to diet (meat, cheese, eggs, fish, peanut butter, nuts and beans)  Multivitamin daily     MY CHART []?     Smart Device  []?      HYPERBARIC TREATMENT-                TREATMENT #     Your next appointment with the [address] [place] [address] is in 2 weeks                                                                                                   ROOM TYPE   []? CHAIR     []? BED   []? EITHER        TIME []? 45 MIN     []? 60 MIN     (Please note your next appointment above and if you are unable to keep, kindly give a 24 hour notice. Thank you.)     If you experience any of the following, please call the [address] during business hours:  [phone]     * Increase in Pain  * Temperature over 101  * Increase in drainage from your wound  * Drainage with a foul odor  * Bleeding  * Increase in swelling  * Need for compression bandage changes due to slippage, breakthrough drainage.     If you need medical attention outside of the business hours of the [address] please contact your PCP or go to the nearest emergency room. The information contained in the After Visit Summary has been reviewed with me, the patient and/or responsible adult, by my health care provider(s). I had the opportunity to ask questions regarding this information. I have elected to receive;      []? After Visit Summary  [x]? Comprehensive Discharge Instruction        Patient signature______________________________________Date:________        Electronically signed by Giovana Mcghee MD on 12/16/2020 at 9:35 AM

## 2020-12-23 ENCOUNTER — HOSPITAL ENCOUNTER (OUTPATIENT)
Age: 55
Discharge: HOME | End: 2020-12-23
Payer: COMMERCIAL

## 2020-12-23 LAB
ABSOLUTE EOS #: 0.15 K/UL (ref 0–0.44)
ABSOLUTE IMMATURE GRANULOCYTE: <0.03 K/UL (ref 0–0.3)
ABSOLUTE LYMPH #: 1.54 K/UL (ref 1.1–3.7)
ABSOLUTE MONO #: 0.38 K/UL (ref 0.1–1.2)
ANION GAP SERPL CALCULATED.3IONS-SCNC: 15 MMOL/L (ref 9–17)
BASOPHILS # BLD: 1 % (ref 0–2)
BASOPHILS ABSOLUTE: 0.03 K/UL (ref 0–0.2)
BUN BLDV-MCNC: 14 MG/DL (ref 6–20)
BUN/CREAT BLD: ABNORMAL (ref 9–20)
CALCIUM SERPL-MCNC: 8.9 MG/DL (ref 8.6–10.4)
CHLORIDE BLD-SCNC: 105 MMOL/L (ref 98–107)
CO2: 20 MMOL/L (ref 20–31)
CREAT SERPL-MCNC: 0.79 MG/DL (ref 0.5–0.9)
DIFFERENTIAL TYPE: NORMAL
EOSINOPHILS RELATIVE PERCENT: 3 % (ref 1–4)
ESTIMATED AVERAGE GLUCOSE: 117 MG/DL
GFR AFRICAN AMERICAN: >60 ML/MIN
GFR NON-AFRICAN AMERICAN: >60 ML/MIN
GFR SERPL CREATININE-BSD FRML MDRD: ABNORMAL ML/MIN/{1.73_M2}
GFR SERPL CREATININE-BSD FRML MDRD: ABNORMAL ML/MIN/{1.73_M2}
GLUCOSE BLD-MCNC: 121 MG/DL (ref 70–99)
HBA1C MFR BLD: 5.7 % (ref 4–6)
HCT VFR BLD CALC: 40.6 % (ref 36.3–47.1)
HEMOGLOBIN: 12.8 G/DL (ref 11.9–15.1)
IMMATURE GRANULOCYTES: 0 %
LYMPHOCYTES # BLD: 25 % (ref 24–43)
MAGNESIUM: 1.8 MG/DL (ref 1.6–2.6)
MCH RBC QN AUTO: 27.3 PG (ref 25.2–33.5)
MCHC RBC AUTO-ENTMCNC: 31.5 G/DL (ref 28.4–34.8)
MCV RBC AUTO: 86.6 FL (ref 82.6–102.9)
MONOCYTES # BLD: 6 % (ref 3–12)
NRBC AUTOMATED: 0 PER 100 WBC
PDW BLD-RTO: 13.7 % (ref 11.8–14.4)
PLATELET # BLD: 244 K/UL (ref 138–453)
PLATELET ESTIMATE: NORMAL
PMV BLD AUTO: 10.1 FL (ref 8.1–13.5)
POTASSIUM SERPL-SCNC: 4.2 MMOL/L (ref 3.7–5.3)
RBC # BLD: 4.69 M/UL (ref 3.95–5.11)
RBC # BLD: NORMAL 10*6/UL
SEG NEUTROPHILS: 65 % (ref 36–65)
SEGMENTED NEUTROPHILS ABSOLUTE COUNT: 3.98 K/UL (ref 1.5–8.1)
SODIUM BLD-SCNC: 140 MMOL/L (ref 135–144)
WBC # BLD: 6.1 K/UL (ref 3.5–11.3)
WBC # BLD: NORMAL 10*3/UL

## 2020-12-23 PROCEDURE — 80048 BASIC METABOLIC PNL TOTAL CA: CPT

## 2020-12-23 PROCEDURE — 36415 COLL VENOUS BLD VENIPUNCTURE: CPT

## 2020-12-23 PROCEDURE — 85025 COMPLETE CBC W/AUTO DIFF WBC: CPT

## 2020-12-23 PROCEDURE — 83735 ASSAY OF MAGNESIUM: CPT

## 2020-12-23 PROCEDURE — 83036 HEMOGLOBIN GLYCOSYLATED A1C: CPT

## 2020-12-30 ENCOUNTER — HOSPITAL ENCOUNTER (OUTPATIENT)
Dept: WOUND CARE | Age: 55
Discharge: HOME | End: 2020-12-30
Payer: COMMERCIAL

## 2020-12-30 VITALS
TEMPERATURE: 99.4 F | RESPIRATION RATE: 18 BRPM | HEART RATE: 81 BPM | HEIGHT: 66 IN | WEIGHT: 280 LBS | SYSTOLIC BLOOD PRESSURE: 173 MMHG | DIASTOLIC BLOOD PRESSURE: 83 MMHG | BODY MASS INDEX: 45 KG/M2

## 2020-12-30 PROCEDURE — 11042 DBRDMT SUBQ TIS 1ST 20SQCM/<: CPT

## 2020-12-30 RX ORDER — LIDOCAINE 50 MG/G
OINTMENT TOPICAL ONCE
Status: CANCELLED | OUTPATIENT
Start: 2020-12-30 | End: 2020-12-30

## 2020-12-30 RX ORDER — LIDOCAINE HYDROCHLORIDE 20 MG/ML
JELLY TOPICAL ONCE
Status: CANCELLED | OUTPATIENT
Start: 2020-12-30 | End: 2020-12-30

## 2020-12-30 RX ORDER — LIDOCAINE HYDROCHLORIDE 40 MG/ML
SOLUTION TOPICAL ONCE
Status: COMPLETED | OUTPATIENT
Start: 2020-12-30 | End: 2020-12-30

## 2020-12-30 RX ORDER — LIDOCAINE 40 MG/G
CREAM TOPICAL ONCE
Status: CANCELLED | OUTPATIENT
Start: 2020-12-30 | End: 2020-12-30

## 2020-12-30 RX ORDER — LIDOCAINE HYDROCHLORIDE 40 MG/ML
SOLUTION TOPICAL ONCE
Status: CANCELLED | OUTPATIENT
Start: 2020-12-30 | End: 2020-12-30

## 2020-12-30 RX ADMIN — LIDOCAINE HYDROCHLORIDE 5 ML: 40 SOLUTION TOPICAL at 09:07

## 2020-12-30 ASSESSMENT — PAIN DESCRIPTION - FREQUENCY: FREQUENCY: INTERMITTENT

## 2020-12-30 ASSESSMENT — PAIN DESCRIPTION - PAIN TYPE: TYPE: CHRONIC PAIN

## 2020-12-30 ASSESSMENT — PAIN DESCRIPTION - PROGRESSION: CLINICAL_PROGRESSION: NOT CHANGED

## 2020-12-30 ASSESSMENT — PAIN DESCRIPTION - DESCRIPTORS: DESCRIPTORS: BURNING

## 2020-12-30 ASSESSMENT — PAIN SCALES - GENERAL: PAINLEVEL_OUTOF10: 2

## 2020-12-30 ASSESSMENT — PAIN DESCRIPTION - LOCATION: LOCATION: ABDOMEN

## 2020-12-30 ASSESSMENT — PAIN DESCRIPTION - ONSET: ONSET: ON-GOING

## 2020-12-30 NOTE — PROGRESS NOTES
[address]   Progress Note and Procedure Note      [facility] RECORD NUMBER:  [id number]  AGE: 50 y.o. GENDER: female  : 1965  EPISODE DATE:  2020    Subjective:     Chief Complaint   Patient presents with    Wound Check     abdomen         HISTORY of PRESENT ILLNESS HPI  Scott Loredo is a 50 y. o. female who presents today for wound/ulcer evaluation. History of Wound Context: presents to wound clinic today for evaluation of left abdominal wall wound with surrounding cellulitis. Wound has been present for last 1 month after what she thinks was a spider bite but does not know for sure.  Was treated at urgent care and did complete course of clindamycin   Wound/Ulcer Pain Timing/Severity: constant  Quality of pain: aching  Severity:  3 / 10   Modifying Factors: Pain worsens with touching  Associated Signs/Symptoms: edema, erythema and drainage     Ulcer Identification:  Ulcer Type: non-healing/non-surgical  Contributing Factors: obesity        Wound: N/APAST MEDICAL HISTORY        Diagnosis Date    Hyperlipidemia     Hypertension     Ulcer        PAST SURGICAL HISTORY    Past Surgical History:   Procedure Laterality Date    DENTAL SURGERY      [place] INCISE FINGER TENDON SHEATH Right 2017    RELEASE A-PULLEY THUMB  performed by Camille Joshua MD at [address] [place] [address]    Family History   Problem Relation Age of Onset    Heart Disease Mother     Heart Disease Maternal Grandmother     Heart Disease Maternal Grandfather        SOCIAL HISTORY    Social History     Tobacco Use    Smoking status: Never Smoker    Smokeless tobacco: Never Used   Substance Use Topics    Alcohol use: No    Drug use: No       ALLERGIES    Allergies   Allergen Reactions    Asa [Aspirin]      Due to history of ulcers    Ibuprofen Other (See Comments)     Abdominal cramping        MEDICATIONS    Current Outpatient Medications on File Prior to Encounter   Medication Sig Dispense Refill    Sulfamethoxazole-Trimethoprim (BACTRIM DS PO) Take by mouth      sulfamethoxazole-trimethoprim (BACTRIM DS;SEPTRA DS) 800-160 MG per tablet Take 1 tablet by mouth 2 times daily      metoprolol succinate (TOPROL XL) 50 MG extended release tablet Take 1 tablet by mouth daily 30 tablet 5    potassium chloride (KLOR-CON M) 20 MEQ TBCR extended release tablet Take 1 tablet by mouth daily (with breakfast) 30 tablet 5    atorvastatin (LIPITOR) 40 MG tablet Take 40 mg by mouth daily      lisinopril-hydroCHLOROthiazide (PRINZIDE;ZESTORETIC) 20-25 MG per tablet Take 0.5 tablets by mouth daily 30 tablet      No current facility-administered medications on file prior to encounter. REVIEW OF SYSTEMS    A comprehensive review of systems was negative.     Objective:      BP (!) 173/83   Pulse 81   Temp 99.4 °F (37.4 °C) (Tympanic)   Resp 18   Ht 5' 6\" (1.676 m)   Wt 280 lb (127 kg)   BMI 45.19 kg/m²     Wt Readings from Last 3 Encounters:   12/30/20 280 lb (127 kg)   12/16/20 280 lb (127 kg)   12/08/20 280 lb (127 kg)       PHYSICAL EXAM    General Appearance: alert and oriented to person, place and time, well developed and well- nourished, in no acute distress  Skin: warm and dry, no rash or erythema  Head: normocephalic and atraumatic  Eyes: pupils equal, round, and reactive to light, extraocular eye movements intact, conjunctivae normal  Cardiovascular: normal rate, regular rhythm, normal S1 and S2, no murmurs, rubs, clicks, or gallops, distal pulses intact, no carotid bruits  Abdomen: soft, non-tender, non-distended, normal bowel sounds, no masses or organomegaly  Extremities: no cyanosis, clubbing or edema  Musculoskeletal: normal range of motion, no joint swelling, deformity or tenderness  Neurologic: reflexes normal and symmetric, no cranial nerve deficit, gait, coordination and speech normal      Assessment:     Problem List Items Addressed This Visit     Morbid obesity ([address] (Chronic)    Relevant Orders    Supply: Wound Cleanser    Supply: Wound Dressings    Supply: Pack Wound    Supply: Cover and Secure    Open abdominal wall wound - Primary (Chronic)    Relevant Orders    Supply: Wound Cleanser    Supply: Wound Dressings    Supply: Pack Wound    Supply: Cover and Secure    Cellulitis of left abdominal wall    Relevant Orders    Supply: Wound Cleanser    Supply: Wound Dressings    Supply: Pack Wound    Supply: Cover and Secure           Procedure Note  Indications:  Based on my examination of this patient's wound(s)/ulcer(s) today, debridement is required to promote healing and evaluate the wound base. Performed by: Shlomo Pham MD    Consent obtained:  Yes    Time out taken:  Yes    Pain Control: Anesthetic  Anesthetic: 4% Lidocaine Liquid Topical       Debridement:Excisional Debridement    Using curette the wound(s)/ulcer(s) was/were sharply debrided down through and including the removal of subcutaneous tissue. Devitalized Tissue Debrided:  biofilm and slough    Pre Debridement Measurements:  Are located in the [place]  Documentation Flow Sheet    Wound/Ulcer #: 1    Post Debridement Measurements:  Wound/Ulcer Descriptions are Pre Debridement except measurements:        Wound 12/08/20 Abdomen Left; Lower wound #1 left lower abdomen (Active)   Wound Image   12/08/20 1403   Wound Etiology Other 12/30/20 0906   Dressing Status Old drainage noted 12/30/20 0906   Wound Cleansed Irrigated with saline 12/30/20 0906   Wound Length (cm) 0.8 cm 12/30/20 0906   Wound Width (cm) 0.5 cm 12/30/20 0906   Wound Depth (cm) 0.1 cm 12/30/20 0906   Wound Surface Area (cm^2) 0.4 cm^2 12/30/20 0906   Change in Wound Size % (l*w) 86.01 12/30/20 0906   Wound Volume (cm^3) 0.04 cm^3 12/30/20 0906   Wound Healing % 95 12/30/20 0906   Post-Procedure Length (cm) 0.5 cm 12/30/20 0906   Post-Procedure Width (cm) 0.3 cm 12/30/20 0906   Post-Procedure Depth (cm) 0.1 cm 12/30/20 0906   Post-Procedure Surface Area (cm^2) 0.15 cm^2 12/30/20 0906   Post-Procedure Volume (cm^3) 0.02 cm^3 12/30/20 0906   Wound Assessment Epithelialization;Pink/red 12/30/20 0906   Drainage Amount Moderate 12/30/20 0906   Drainage Description Serosanguinous 12/30/20 0906   Odor None 12/30/20 0906   Shwetha-wound Assessment Blanchable erythema 12/30/20 0906   Margins Attached edges 12/30/20 0906   Number of days: 21          Percent of Wound(s)/Ulcer(s) Debrided: 100%    Total Surface Area Debrided:  0.15 sq cm       Diabetic/Pressure/Non Pressure Ulcers only:  Ulcer: Non-Pressure ulcer, fat layer exposed      Estimated Blood Loss:  Minimal    Hemostasis Achieved:  by pressure    Procedural Pain:  1  / 10     Post Procedural Pain:  1 / 10     Response to treatment:  Well tolerated by patient. Plan:     Treatment Note please see attached Discharge Instructions    Written patient dismissal instructions given to patient and signed by patient or POA. Discharge Instructions         [address] [place] [address] and [facility]                                 Visit Fly Instructions / Physician Orders  DATE:12/30/20     Home Care:     SUPPLIES ORDERED THRU:     Wound Location: Abdomen      Cleanse with: Liquid antibacterial soap and water, rinse well      Dressing Orders:  Primary dressing  Steph      Secondary dressing   Dry gauze Secure with mefix tape                           Frequency: Daily      Additional Orders: Increase protein to diet (meat, cheese, eggs, fish, peanut butter, nuts and beans)  Multivitamin daily     MY CHART []? ?     Smart Device  []? ?      HYPERBARIC TREATMENT-                DMGCOWITU #     Your next appointment with the [address] [place] [address] is in 2 weeks                                                                                                   ROOM TYPE   []? ? CHAIR     []? ? BED   []? ? EITHER        TIME []?? 45 MIN     []? ? 60 MIN     (Please note your next appointment above and if you are unable to keep, kindly give a 24 hour notice. Thank you.)     If you experience any of the following, please call the [address] during business hours:  [phone]     * Increase in Pain  * Temperature over 101  * Increase in drainage from your wound  * Drainage with a foul odor  * Bleeding  * Increase in swelling  * Need for compression bandage changes due to slippage, breakthrough drainage.     If you need medical attention outside of the business hours of the [address] please contact your PCP or go to the nearest emergency room.     The information contained in the After Visit Summary has been reviewed with me, the patient and/or responsible adult, by my health care provider(s). I had the opportunity to ask questions regarding this information. I have elected to receive;      []? ? After Visit Summary  [x]? ? Comprehensive Discharge Instruction        Patient signature______________________________________Date:________  Electronically signed by Jennifer Tripathi RN on 12/30/2020 at 9:21 AM          Electronically signed by Rocío Stroud MD on 12/30/2020 at 9:23 AM

## 2020-12-30 NOTE — DISCHARGE INSTR - COC
Continuity of Care Form    Patient Name: Jaleesa Santoyo   :  1965  MRN:  [id number]    Admit date:  2020  Discharge date:  ***    Code Status Order: No Order   Advance Directives:   Advance Care Flowsheet Documentation     Date/Time Healthcare Directive Type of Healthcare Directive Copy in [address] Agent's Name Healthcare Agent's Phone Number    20  No, patient does not have an advance directive for healthcare treatment -- -- -- -- --          Admitting Physician:  No admitting provider for patient encounter. PCP: Alvin Cristina MD    Discharging Nurse: [facility] Unit/Room#: No information available for this encounter. Discharging Unit Phone Number: ***    Emergency Contact:   Extended Emergency Contact Information  Primary Emergency Contact: [address] Phone: [phone]  Mobile Phone: [phone]  Relation: Child    Past Surgical History:  Past Surgical History:   Procedure Laterality Date    DENTAL SURGERY      [place] INCISE FINGER TENDON SHEATH Right 2017    RELEASE A-PULLEY THUMB  performed by Filomena Centeno MD at [address] [place] [address]         Immunization History: There is no immunization history on file for this patient.     Active Problems:  Patient Active Problem List   Diagnosis Code    Trigger finger of right thumb M65.311    Vitamin deficiency E56.9    Morbid obesity ([address] E66.01    Hypertensive disorder I10    Hyperlipidemia E78.5    Heartburn R12    Open abdominal wall wound S31.109A    Cellulitis of left abdominal wall L03.311    Cardiac fibrillation I49.8       Isolation/Infection:   Isolation          No Isolation        Patient Infection Status     Infection Onset Added Last Indicated Last Indicated By Review Planned Expiration Resolved Resolved By    MRSA 20 Culture, Tissue        Abdomen tissue 2020          Nurse Assessment:  Last Vital Signs: BP (!) 173/83   Pulse 81   Temp 99.4 °F (37.4 °C) (Tympanic)   Resp 18   Ht 5' 6\" (1.676 m)   Wt 280 lb (127 kg)   BMI 45.19 kg/m²     Last documented pain score (0-10 scale): Pain Level: 2  Last Weight:   Wt Readings from Last 1 Encounters:   12/30/20 280 lb (127 kg)     Mental Status:  {IP PT MENTAL STATUS:09999}    IV Access:  { PAULETTE IV ACCESS:967804681}    Nursing Mobility/ADLs:  Walking   {CHP DME QCOS:230192387}  Transfer  {CHP DME DQXN:015575209}  Bathing  {CHP DME ORLK:068402302}  Dressing  {CHP DME DXAY:333270082}  Toileting  {CHP DME REUF:102126830}  Feeding  {P DME EDOUARD:732078792}  Med Admin  {[facility] DME GTBW:710320915}  Med Delivery   { PAULETTE MED Delivery:294775141}    Wound Care Documentation and Therapy:  Wound 12/08/20 Abdomen Left; Lower wound #1 left lower abdomen (Active)   Wound Image   12/08/20 1403   Wound Etiology Other 12/30/20 0906   Dressing Status Old drainage noted 12/30/20 0906   Wound Cleansed Irrigated with saline 12/30/20 0906   Wound Length (cm) 0.8 cm 12/30/20 0906   Wound Width (cm) 0.5 cm 12/30/20 0906   Wound Depth (cm) 0.1 cm 12/30/20 0906   Wound Surface Area (cm^2) 0.4 cm^2 12/30/20 0906   Change in Wound Size % (l*w) 86.01 12/30/20 0906   Wound Volume (cm^3) 0.04 cm^3 12/30/20 0906   Wound Healing % 95 12/30/20 0906   Post-Procedure Length (cm) 0.5 cm 12/30/20 0906   Post-Procedure Width (cm) 0.3 cm 12/30/20 0906   Post-Procedure Depth (cm) 0.1 cm 12/30/20 0906   Post-Procedure Surface Area (cm^2) 0.15 cm^2 12/30/20 0906   Post-Procedure Volume (cm^3) 0.02 cm^3 12/30/20 0906   Wound Assessment Epithelialization;Pink/red 12/30/20 0906   Drainage Amount Moderate 12/30/20 0906   Drainage Description Serosanguinous 12/30/20 0906   Odor None 12/30/20 0906   Shwetha-wound Assessment Blanchable erythema 12/30/20 0906   Margins Attached edges 12/30/20 0906   Number of days: 21        Elimination:  Continence:   · Bowel: {YES / :94917}  · Bladder: {YES / ARORA:34019}  Urinary Catheter: {Urinary Catheter:514058414}   Colostomy/Ileostomy/Ileal Conduit: {YES / SO:58021}       Date of Last BM: ***  No intake or output data in the 24 hours ending 20 0925  No intake/output data recorded.     Safety Concerns:     [address]LL Safety Concerns:232122660}    Impairments/Disabilities:      [address] [facility] Impairments/Disabilities:091039109}    Nutrition Therapy:  Current Nutrition Therapy:   [address] [facility] Diet List:389590075}    Routes of Feeding: {CHP DME Other Feedings:895126507}  Liquids: {Slp liquid thickness:02181}  Daily Fluid Restriction: {CHP DME Yes amt example:962527338}  Last Modified Barium Swallow with Video (Video Swallowing Test): {Done Not Done SAQJ:585810595}    Treatments at the Time of Hospital Discharge:   Respiratory Treatments: ***  Oxygen Therapy:  {Therapy; copd oxygen:68793}  Ventilator:    { CC Vent GHKN:665242720}    Rehab Therapies: {THERAPEUTIC INTERVENTION:7589860938}  Weight Bearing Status/Restrictions: [address] [facility] Weight Bearin}  Other Medical Equipment (for information only, NOT a DME order):  {EQUIPMENT:292425342}  Other Treatments: ***    Patient's personal belongings (please select all that are sent with patient):  {CHP DME Belongings:724316969}    RN SIGNATURE:  {Esignature:657847303}    CASE MANAGEMENT/SOCIAL WORK SECTION    Inpatient Status Date: ***    Readmission Risk Assessment Score:  Readmission Risk              Risk of Unplanned Readmission:        0           Discharging to Facility/ Agency   · Name:   · Address:  · Phone:  · Fax:    Dialysis Facility (if applicable)   · Name:  · Address:  · Dialysis Schedule:  · Phone:  · Fax:    / signature: {Esignature:968539661}    PHYSICIAN SECTION    Prognosis: {Prognosis:1606659521}    Condition at Discharge: [address] Patient Condition:586922203}    Rehab Potential (if transferring to Rehab): {Prognosis:5400995182}    Recommended Labs or Other Treatments After Discharge: ***    Physician Certification: I certify the above information and transfer of Joselito Cannon  is necessary for the continuing treatment of the diagnosis listed and that she requires {Admit to Appropriate Level of Care:49509} for {GREATER/LESS:828311497} 30 days.      Update Admission H&P: {CHP DME Changes in [facility]:020329993}    PHYSICIAN SIGNATURE:  {Esignature:174003310}

## 2021-01-13 ENCOUNTER — HOSPITAL ENCOUNTER (OUTPATIENT)
Dept: WOUND CARE | Age: 56
Discharge: HOME | End: 2021-01-13
Payer: COMMERCIAL

## 2021-01-13 VITALS
DIASTOLIC BLOOD PRESSURE: 64 MMHG | TEMPERATURE: 95.4 F | HEART RATE: 67 BPM | RESPIRATION RATE: 20 BRPM | WEIGHT: 280 LBS | SYSTOLIC BLOOD PRESSURE: 132 MMHG | HEIGHT: 66 IN | BODY MASS INDEX: 45 KG/M2

## 2021-01-13 PROCEDURE — 99212 OFFICE O/P EST SF 10 MIN: CPT

## 2021-01-13 NOTE — DISCHARGE INSTR - COC
Continuity of Care Form    Patient Name: Vasile Heaton   :  1965  MRN:  [id number]    Admit date:  2021  Discharge date:  ***    Code Status Order: No Order   Advance Directives:     Admitting Physician:  No admitting provider for patient encounter. PCP: Ashley Almanzar MD    Discharging Nurse: [facility] Unit/Room#: No information available for this encounter. Discharging Unit Phone Number: ***    Emergency Contact:   Extended Emergency Contact Information  Primary Emergency Contact: [address] Phone: [phone]  Mobile Phone: [phone]  Relation: Child    Past Surgical History:  Past Surgical History:   Procedure Laterality Date    DENTAL SURGERY      CT INCISE FINGER TENDON SHEATH Right 2017    RELEASE A-PULLEY THUMB  performed by Leisa Tony MD at [address] [place] [address]         Immunization History: There is no immunization history on file for this patient.     Active Problems:  Patient Active Problem List   Diagnosis Code    Trigger finger of right thumb M65.311    Vitamin deficiency E56.9    Morbid obesity ([address] E66.01    Hypertensive disorder I10    Hyperlipidemia E78.5    Heartburn R12    Open abdominal wall wound S31.109A    Cellulitis of left abdominal wall L03.311    Cardiac fibrillation I49.8       Isolation/Infection:   Isolation          No Isolation        Patient Infection Status     Infection Onset Added Last Indicated Last Indicated By Review Planned Expiration Resolved Resolved By    MRSA 20 Culture, Tissue        Abdomen tissue 2020          Nurse Assessment:  Last Vital Signs: /64   Pulse 67   Temp 95.4 °F (35.2 °C) (Tympanic)   Resp 20   Ht 5' 6\" (1.676 m)   Wt 280 lb (127 kg)   BMI 45.19 kg/m²     Last documented pain score (0-10 scale): Pain Level: 0  Last Weight:   Wt Readings from Last 1 Encounters:   21 280 lb (127 kg)     Mental Status:  {IP PT MENTAL STATUS:}    IV Access:  { PAULETTE IV ACCESS:839086883}    Nursing Mobility/ADLs:  Walking   {CHP DME TZLD:492907762}  Transfer  {CHP DME RKPI:528648976}  Bathing  {CHP DME TVHW:847299528}  Dressing  {CHP DME BHRQ:092192750}  Toileting  {P DME EWVW:144390746}  Feeding  {[facility] DME BTZF:925058004}  Med Admin  {P DME FUNC:152498480}  Med Delivery   { PAULETTE MED Delivery:172017957}    Wound Care Documentation and Therapy:  Wound 20 Abdomen Left; Lower wound #1 left lower abdomen (Active)   Wound Image   21   Wound Etiology Other 20   Dressing Status Old drainage noted 20   Wound Cleansed Irrigated with saline 20   Wound Length (cm) 0 cm 21   Wound Width (cm) 0 cm 21   Wound Depth (cm) 0 cm 21   Wound Surface Area (cm^2) 0 cm^2 21   Change in Wound Size % (l*w) 100 21   Wound Volume (cm^3) 0 cm^3 21   Wound Healing % 100 21   Post-Procedure Length (cm) 0 cm 21   Post-Procedure Width (cm) 0 cm 21   Post-Procedure Depth (cm) 0 cm 21   Post-Procedure Surface Area (cm^2) 0 cm^2 21   Post-Procedure Volume (cm^3) 0 cm^3 21   Wound Assessment Epithelialization;Pink/red 20   Drainage Amount Moderate 20   Drainage Description Serosanguinous 20   Odor None 20   Shwetha-wound Assessment Blanchable erythema 20   Margins Attached edges 20   Number of days: 35        Elimination:  Continence:   · Bowel: {YES / U}  · Bladder: {YES / ZD:93704}  Urinary Catheter: {Urinary Catheter:637875524}   Colostomy/Ileostomy/Ileal Conduit: {YES / LD:08603}       Date of Last BM: ***  No intake or output data in the 24 hours ending 21 0943  No intake/output data recorded.     Safety Concerns:     [address]LL Safety Concerns:086759597}    Impairments/Disabilities:      [address] CAL[address]LL Impairments/Disabilities:752930309}    Nutrition Therapy:  Current Nutrition Therapy:   [address] PAULETTE Diet List:534872026}    Routes of Feeding: {CHP DME Other Feedings:342048534}  Liquids: {Slp liquid thickness:25385}  Daily Fluid Restriction: {CHP DME Yes amt example:650683935}  Last Modified Barium Swallow with Video (Video Swallowing Test): {Done Not Done TXVD:158694426}    Treatments at the Time of Hospital Discharge:   Respiratory Treatments: ***  Oxygen Therapy:  {Therapy; copd oxygen:71674}  Ventilator:    { CC Vent DXMO:243261227}    Rehab Therapies: {THERAPEUTIC INTERVENTION:3778108067}  Weight Bearing Status/Restrictions: [address] ROSA Weight Bearin}  Other Medical Equipment (for information only, NOT a DME order):  {EQUIPMENT:059964821}  Other Treatments: ***    Patient's personal belongings (please select all that are sent with patient):  {[facility] DME Belongings:925283144}    RN SIGNATURE:  {Esignature:231178783}    CASE MANAGEMENT/SOCIAL WORK SECTION    Inpatient Status Date: ***    Readmission Risk Assessment Score:  Readmission Risk              Risk of Unplanned Readmission:        0           Discharging to Facility/ Agency   · Name:   · Address:  · Phone:  · Fax:    Dialysis Facility (if applicable)   · Name:  · Address:  · Dialysis Schedule:  · Phone:  · Fax:    / signature: {Esignature:844932789}    PHYSICIAN SECTION    Prognosis: {Prognosis:1406250769}    Condition at Discharge: [address] Patient Condition:308840748}    Rehab Potential (if transferring to Rehab): {Prognosis:9864479582}    Recommended Labs or Other Treatments After Discharge: ***    Physician Certification: I certify the above information and transfer of Monico Alvarado  is necessary for the continuing treatment of the diagnosis listed and that she requires {Admit to Appropriate Level of Care:76349} for {GREATER/LESS:111576786} 30 days.      Update Admission H&P: {CHP DME Changes in YBBGF:850375272}    PHYSICIAN SIGNATURE:  {[facility]:037618584}

## 2021-01-13 NOTE — PROGRESS NOTES
[address]   Progress Note and Procedure Note      [address] [facility] RECORD NUMBER:  [id number]  AGE: 50 y.o. GENDER: female  : 1965  EPISODE DATE:  2021    Subjective:     Chief Complaint   Patient presents with    Wound Check     ABD         HISTORY of PRESENT ILLNESS HPI     Dasia Sorensen is a 50 y.o. female who presents today for wound/ulcer evaluation.    History of Wound Context: She had a wound from an abdominal wall abscess  Wound/Ulcer Pain Timing/Severity: intermittent  Quality of pain: dull  Severity:  0 / 10   Modifying Factors: None  Associated Signs/Symptoms: none    Ulcer Identification:  Ulcer Type: non-healing surgical  Contributing Factors: obesity    Wound: N/A        PAST MEDICAL HISTORY        Diagnosis Date    Hyperlipidemia     Hypertension     Ulcer        PAST SURGICAL HISTORY    Past Surgical History:   Procedure Laterality Date    DENTAL SURGERY      [place] INCISE FINGER TENDON SHEATH Right 2017    RELEASE A-PULLEY THUMB  performed by Lb Kellogg MD at [address] [place] [address]    Family History   Problem Relation Age of Onset    Heart Disease Mother     Heart Disease Maternal Grandmother     Heart Disease Maternal Grandfather        SOCIAL HISTORY    Social History     Tobacco Use    Smoking status: Never Smoker    Smokeless tobacco: Never Used   Substance Use Topics    Alcohol use: No    Drug use: No       ALLERGIES    Allergies   Allergen Reactions    Asa [Aspirin]      Due to history of ulcers    Ibuprofen Other (See Comments)     Abdominal cramping        MEDICATIONS    Current Outpatient Medications on File Prior to Encounter   Medication Sig Dispense Refill    metoprolol succinate (TOPROL XL) 50 MG extended release tablet Take 1 tablet by mouth daily 30 tablet 5    potassium chloride (KLOR-CON M) 20 MEQ TBCR extended release tablet Take 1 tablet by mouth daily (with breakfast) 30 tablet 5    atorvastatin (LIPITOR) 40 MG tablet Take 40 mg by mouth daily      lisinopril-hydroCHLOROthiazide (PRINZIDE;ZESTORETIC) 20-25 MG per tablet Take 0.5 tablets by mouth daily 30 tablet      No current facility-administered medications on file prior to encounter. REVIEW OF SYSTEMS    Pertinent items are noted in HPI. Objective:      /64   Pulse 67   Temp 95.4 °F (35.2 °C) (Tympanic)   Resp 20   Ht 5' 6\" (1.676 m)   Wt 280 lb (127 kg)   BMI 45.19 kg/m²     Wt Readings from Last 3 Encounters:   01/13/21 280 lb (127 kg)   12/30/20 280 lb (127 kg)   12/16/20 280 lb (127 kg)       PHYSICAL EXAM    General Appearance: alert and oriented to person, place and time, well developed and well- nourished, in no acute distress  Skin: warm and dry, no rash or erythema  Head: normocephalic and atraumatic  Eyes: pupils equal, round, and reactive to light, extraocular eye movements intact, conjunctivae normal  Cardiovascular: normal rate, regular rhythm, normal S1 and S2, no murmurs, rubs, clicks, or gallops, distal pulses intact, no carotid bruits  Abdomen: soft, non-tender, non-distended, normal bowel sounds, no masses or organomegaly  Extremities: no cyanosis, clubbing or edema  Musculoskeletal: normal range of motion, no joint swelling, deformity or tenderness  Neurologic: reflexes normal and symmetric, no cranial nerve deficit, gait, coordination and speech normal      Assessment:      Problem List Items Addressed This Visit     None           Wound 12/08/20 Abdomen Left; Lower wound #1 left lower abdomen (Active)   Wound Image   01/13/21 0900   Wound Etiology Other 12/30/20 0906   Dressing Status Old drainage noted 12/30/20 0906   Wound Cleansed Irrigated with saline 12/30/20 0906   Wound Length (cm) 0 cm 01/13/21 0900   Wound Width (cm) 0 cm 01/13/21 0900   Wound Depth (cm) 0 cm 01/13/21 0900   Wound Surface Area (cm^2) 0 cm^2 01/13/21 0900   Change in Wound Size % (l*w) 100 01/13/21 0900   Wound Volume (cm^3) 0 cm^3 01/13/21 0900   Wound Healing % 100 01/13/21 0900   Post-Procedure Length (cm) 0 cm 01/13/21 0900   Post-Procedure Width (cm) 0 cm 01/13/21 0900   Post-Procedure Depth (cm) 0 cm 01/13/21 0900   Post-Procedure Surface Area (cm^2) 0 cm^2 01/13/21 0900   Post-Procedure Volume (cm^3) 0 cm^3 01/13/21 0900   Wound Assessment Epithelialization;Pink/red 12/30/20 7910   Drainage Amount Moderate 12/30/20 0906   Drainage Description Serosanguinous 12/30/20 0906   Odor None 12/30/20 0906   Shwetha-wound Assessment Blanchable erythema 12/30/20 0906   Margins Attached edges 12/30/20 9173   Number of days: 35          Wound is healed    Plan:     Treatment Note please see attached Discharge Instructions    Written patient dismissal instructions given to patient and signed by patient or POA. Discharge Instructions         [address] [facility]                                 Visit Fly Instructions / Physician Orders  DATE:1/13/21     Home Care:     SUPPLIES ORDERED THRU:     Wound Location: Abdomen -Healed     Cleanse with: Liquid antibacterial soap and water, rinse well      Dressing Orders:                            Frequency:       Additional Orders: Increase protein to diet (meat, cheese, eggs, fish, peanut butter, nuts and beans)  Multivitamin daily     MY CHART []???     Smart Device  []???      HYPERBARIC TREATMENT-                TREATMENT #     Your next appointment with the Wound Care Center-call if needed                                                                                                   ROOM TYPE   []? ?? CHAIR     []? ?? BED   []? ?? EITHER        TIME []??? 45 MIN     []? ?? 60 MIN     (Please note your next appointment above and if you are unable to keep, kindly give a 24 hour notice.  Thank you.)     If you experience any of the following, please call the [address] [place] [address] Road during business hours:  [phone]     * Increase in Pain  * Temperature over 101  * Increase in drainage from your wound  * Drainage with a foul odor  * Bleeding  * Increase in swelling  * Need for compression bandage changes due to slippage, breakthrough drainage.     If you need medical attention outside of the business hours of the [address] [place] [address] Road please contact your PCP or go to the nearest emergency room.     The information contained in the After Visit Summary has been reviewed with me, the patient and/or responsible adult, by my health care provider(s). I had the opportunity to ask questions regarding this information. I have elected to receive;      []? ? ? After Visit Summary  [x]? ?? Comprehensive Discharge Instruction        Patient signature______________________________________Date:________  Electronically signed by Esther Cottrell RN on 1/13/2021 at 9:19 AM  Electronically signed by Lenny White MD on 1/13/2021 at 9:43 AM          Electronically signed by Lenny White MD on 1/13/2021 at 10:39 AM

## 2021-08-13 ENCOUNTER — HOSPITAL ENCOUNTER (OUTPATIENT)
Age: 56
Discharge: HOME | End: 2021-08-13
Payer: COMMERCIAL

## 2021-08-13 LAB
ABSOLUTE EOS #: 0.28 K/UL (ref 0–0.44)
ABSOLUTE IMMATURE GRANULOCYTE: 0.03 K/UL (ref 0–0.3)
ABSOLUTE LYMPH #: 1.38 K/UL (ref 1.1–3.7)
ABSOLUTE MONO #: 0.37 K/UL (ref 0.1–1.2)
ALBUMIN SERPL-MCNC: 4.2 G/DL (ref 3.5–5.2)
ALBUMIN/GLOBULIN RATIO: 1.6 (ref 1–2.5)
ALP BLD-CCNC: 100 U/L (ref 35–104)
ALT SERPL-CCNC: 60 U/L (ref 5–33)
ANION GAP SERPL CALCULATED.3IONS-SCNC: 16 MMOL/L (ref 9–17)
AST SERPL-CCNC: 32 U/L
BASOPHILS # BLD: 1 % (ref 0–2)
BASOPHILS ABSOLUTE: 0.04 K/UL (ref 0–0.2)
BILIRUB SERPL-MCNC: 0.56 MG/DL (ref 0.3–1.2)
BUN BLDV-MCNC: 15 MG/DL (ref 6–20)
BUN/CREAT BLD: ABNORMAL (ref 9–20)
CALCIUM SERPL-MCNC: 9.1 MG/DL (ref 8.6–10.4)
CHLORIDE BLD-SCNC: 106 MMOL/L (ref 98–107)
CHOLESTEROL, FASTING: 119 MG/DL
CHOLESTEROL/HDL RATIO: 4.3
CO2: 21 MMOL/L (ref 20–31)
CREAT SERPL-MCNC: 0.77 MG/DL (ref 0.5–0.9)
DIFFERENTIAL TYPE: ABNORMAL
EOSINOPHILS RELATIVE PERCENT: 6 % (ref 1–4)
GFR AFRICAN AMERICAN: >60 ML/MIN
GFR NON-AFRICAN AMERICAN: >60 ML/MIN
GFR SERPL CREATININE-BSD FRML MDRD: ABNORMAL ML/MIN/{1.73_M2}
GFR SERPL CREATININE-BSD FRML MDRD: ABNORMAL ML/MIN/{1.73_M2}
GLUCOSE BLD-MCNC: 150 MG/DL (ref 70–99)
HCT VFR BLD CALC: 43.3 % (ref 36.3–47.1)
HDLC SERPL-MCNC: 28 MG/DL
HEMOGLOBIN: 13.9 G/DL (ref 11.9–15.1)
IMMATURE GRANULOCYTES: 1 %
LDL CHOLESTEROL: 63 MG/DL (ref 0–130)
LYMPHOCYTES # BLD: 31 % (ref 24–43)
MCH RBC QN AUTO: 28.7 PG (ref 25.2–33.5)
MCHC RBC AUTO-ENTMCNC: 32.1 G/DL (ref 28.4–34.8)
MCV RBC AUTO: 89.5 FL (ref 82.6–102.9)
MONOCYTES # BLD: 8 % (ref 3–12)
NRBC AUTOMATED: 0 PER 100 WBC
PDW BLD-RTO: 12.9 % (ref 11.8–14.4)
PLATELET # BLD: 214 K/UL (ref 138–453)
PLATELET ESTIMATE: ABNORMAL
PMV BLD AUTO: 10.3 FL (ref 8.1–13.5)
POTASSIUM SERPL-SCNC: 3.9 MMOL/L (ref 3.7–5.3)
RBC # BLD: 4.84 M/UL (ref 3.95–5.11)
RBC # BLD: ABNORMAL 10*6/UL
SEG NEUTROPHILS: 53 % (ref 36–65)
SEGMENTED NEUTROPHILS ABSOLUTE COUNT: 2.41 K/UL (ref 1.5–8.1)
SODIUM BLD-SCNC: 143 MMOL/L (ref 135–144)
TOTAL PROTEIN: 6.9 G/DL (ref 6.4–8.3)
TRIGLYCERIDE, FASTING: 142 MG/DL
TSH SERPL DL<=0.05 MIU/L-ACNC: 2.64 MIU/L (ref 0.3–5)
VITAMIN D 25-HYDROXY: 20.8 NG/ML (ref 30–100)
VLDLC SERPL CALC-MCNC: ABNORMAL MG/DL (ref 1–30)
WBC # BLD: 4.5 K/UL (ref 3.5–11.3)
WBC # BLD: ABNORMAL 10*3/UL

## 2021-08-13 PROCEDURE — 84443 ASSAY THYROID STIM HORMONE: CPT

## 2021-08-13 PROCEDURE — 82306 VITAMIN D 25 HYDROXY: CPT

## 2021-08-13 PROCEDURE — 85025 COMPLETE CBC W/AUTO DIFF WBC: CPT

## 2021-08-13 PROCEDURE — 80053 COMPREHEN METABOLIC PANEL: CPT

## 2021-08-13 PROCEDURE — 80061 LIPID PANEL: CPT

## 2021-08-13 PROCEDURE — 36415 COLL VENOUS BLD VENIPUNCTURE: CPT

## 2021-11-13 ENCOUNTER — APPOINTMENT (OUTPATIENT)
Dept: CT IMAGING | Age: 56
DRG: 203 | End: 2021-11-13
Payer: COMMERCIAL

## 2021-11-13 ENCOUNTER — APPOINTMENT (OUTPATIENT)
Dept: GENERAL RADIOLOGY | Age: 56
DRG: 203 | End: 2021-11-13
Payer: COMMERCIAL

## 2021-11-13 ENCOUNTER — HOSPITAL ENCOUNTER (INPATIENT)
Age: 56
LOS: 1 days | Discharge: HOME | DRG: 203 | End: 2021-11-13
Attending: EMERGENCY MEDICINE | Admitting: INTERNAL MEDICINE
Payer: COMMERCIAL

## 2021-11-13 VITALS
OXYGEN SATURATION: 98 % | TEMPERATURE: 97.4 F | HEART RATE: 60 BPM | WEIGHT: 282.5 LBS | DIASTOLIC BLOOD PRESSURE: 49 MMHG | BODY MASS INDEX: 45.4 KG/M2 | RESPIRATION RATE: 20 BRPM | SYSTOLIC BLOOD PRESSURE: 103 MMHG | HEIGHT: 66 IN

## 2021-11-13 DIAGNOSIS — R07.9 CHEST PAIN, UNSPECIFIED TYPE: Primary | ICD-10-CM

## 2021-11-13 DIAGNOSIS — R09.02 HYPOXIA: ICD-10-CM

## 2021-11-13 PROBLEM — Z86.16 HISTORY OF COVID-19: Status: ACTIVE | Noted: 2021-11-13

## 2021-11-13 PROBLEM — R07.89 ATYPICAL CHEST PAIN: Status: ACTIVE | Noted: 2021-11-13

## 2021-11-13 LAB
ABSOLUTE EOS #: 0.28 K/UL (ref 0–0.44)
ABSOLUTE IMMATURE GRANULOCYTE: 0.06 K/UL (ref 0–0.3)
ABSOLUTE LYMPH #: 1.68 K/UL (ref 1.1–3.7)
ABSOLUTE MONO #: 0.45 K/UL (ref 0.1–1.2)
ADENOVIRUS PCR: NOT DETECTED
ALBUMIN SERPL-MCNC: 4.1 G/DL (ref 3.5–5.2)
ALBUMIN/GLOBULIN RATIO: 1.3 (ref 1–2.5)
ALP BLD-CCNC: 108 U/L (ref 35–104)
ALT SERPL-CCNC: 42 U/L (ref 5–33)
ANION GAP SERPL CALCULATED.3IONS-SCNC: 13 MMOL/L (ref 9–17)
AST SERPL-CCNC: 24 U/L
BASOPHILS # BLD: 1 % (ref 0–2)
BASOPHILS ABSOLUTE: 0.04 K/UL (ref 0–0.2)
BILIRUB SERPL-MCNC: 0.49 MG/DL (ref 0.3–1.2)
BNP INTERPRETATION: NORMAL
BORDETELLA PARAPERTUSSIS: NOT DETECTED
BORDETELLA PERTUSSIS PCR: NOT DETECTED
BUN BLDV-MCNC: 18 MG/DL (ref 6–20)
BUN/CREAT BLD: ABNORMAL (ref 9–20)
CALCIUM SERPL-MCNC: 8.8 MG/DL (ref 8.6–10.4)
CHLAMYDIA PNEUMONIAE BY PCR: NOT DETECTED
CHLORIDE BLD-SCNC: 105 MMOL/L (ref 98–107)
CO2: 22 MMOL/L (ref 20–31)
CORONAVIRUS 229E PCR: NOT DETECTED
CORONAVIRUS HKU1 PCR: NOT DETECTED
CORONAVIRUS NL63 PCR: NOT DETECTED
CORONAVIRUS OC43 PCR: NOT DETECTED
CREAT SERPL-MCNC: 0.75 MG/DL (ref 0.5–0.9)
D-DIMER QUANTITATIVE: 0.38 MG/L FEU
DIFFERENTIAL TYPE: ABNORMAL
EKG ATRIAL RATE: 58 BPM
EKG ATRIAL RATE: 71 BPM
EKG P AXIS: 51 DEGREES
EKG P AXIS: 61 DEGREES
EKG P-R INTERVAL: 156 MS
EKG P-R INTERVAL: 166 MS
EKG Q-T INTERVAL: 408 MS
EKG Q-T INTERVAL: 450 MS
EKG QRS DURATION: 74 MS
EKG QRS DURATION: 96 MS
EKG QTC CALCULATION (BAZETT): 441 MS
EKG QTC CALCULATION (BAZETT): 443 MS
EKG R AXIS: 31 DEGREES
EKG R AXIS: 52 DEGREES
EKG T AXIS: 26 DEGREES
EKG T AXIS: 39 DEGREES
EKG VENTRICULAR RATE: 58 BPM
EKG VENTRICULAR RATE: 71 BPM
EOSINOPHILS RELATIVE PERCENT: 6 % (ref 1–4)
GFR AFRICAN AMERICAN: >60 ML/MIN
GFR NON-AFRICAN AMERICAN: >60 ML/MIN
GFR SERPL CREATININE-BSD FRML MDRD: ABNORMAL ML/MIN/{1.73_M2}
GFR SERPL CREATININE-BSD FRML MDRD: ABNORMAL ML/MIN/{1.73_M2}
GLUCOSE BLD-MCNC: 123 MG/DL (ref 70–99)
HCT VFR BLD CALC: 43.3 % (ref 36.3–47.1)
HEMOGLOBIN: 14.3 G/DL (ref 11.9–15.1)
HUMAN METAPNEUMOVIRUS PCR: NOT DETECTED
IMMATURE GRANULOCYTES: 1 %
INFLUENZA A BY PCR: NOT DETECTED
INFLUENZA A H1 (2009) PCR: NORMAL
INFLUENZA A H1 PCR: NORMAL
INFLUENZA A H3 PCR: NORMAL
INFLUENZA B BY PCR: NOT DETECTED
LIPASE: 30 U/L (ref 13–60)
LYMPHOCYTES # BLD: 33 % (ref 24–43)
MAGNESIUM: 1.9 MG/DL (ref 1.6–2.6)
MCH RBC QN AUTO: 28.9 PG (ref 25.2–33.5)
MCHC RBC AUTO-ENTMCNC: 33 G/DL (ref 28.4–34.8)
MCV RBC AUTO: 87.5 FL (ref 82.6–102.9)
MONOCYTES # BLD: 9 % (ref 3–12)
MYCOPLASMA PNEUMONIAE PCR: NOT DETECTED
NRBC AUTOMATED: 0 PER 100 WBC
PARAINFLUENZA 1 PCR: NOT DETECTED
PARAINFLUENZA 2 PCR: NOT DETECTED
PARAINFLUENZA 3 PCR: NOT DETECTED
PARAINFLUENZA 4 PCR: NOT DETECTED
PDW BLD-RTO: 13 % (ref 11.8–14.4)
PLATELET # BLD: 242 K/UL (ref 138–453)
PLATELET ESTIMATE: ABNORMAL
PMV BLD AUTO: 10 FL (ref 8.1–13.5)
POTASSIUM SERPL-SCNC: 3.5 MMOL/L (ref 3.7–5.3)
PRO-BNP: <20 PG/ML
RBC # BLD: 4.95 M/UL (ref 3.95–5.11)
RBC # BLD: ABNORMAL 10*6/UL
RESP SYNCYTIAL VIRUS PCR: NOT DETECTED
RHINO/ENTEROVIRUS PCR: NOT DETECTED
SARS-COV-2, PCR: NOT DETECTED
SEG NEUTROPHILS: 50 % (ref 36–65)
SEGMENTED NEUTROPHILS ABSOLUTE COUNT: 2.6 K/UL (ref 1.5–8.1)
SODIUM BLD-SCNC: 140 MMOL/L (ref 135–144)
SPECIMEN DESCRIPTION: NORMAL
TOTAL PROTEIN: 7.3 G/DL (ref 6.4–8.3)
TROPONIN INTERP: NORMAL
TROPONIN T: NORMAL NG/ML
TROPONIN, HIGH SENSITIVITY: <6 NG/L (ref 0–14)
WBC # BLD: 5.1 K/UL (ref 3.5–11.3)
WBC # BLD: ABNORMAL 10*3/UL

## 2021-11-13 PROCEDURE — 71250 CT THORAX DX C-: CPT

## 2021-11-13 PROCEDURE — G0378 HOSPITAL OBSERVATION PER HR: HCPCS

## 2021-11-13 PROCEDURE — 99232 SBSQ HOSP IP/OBS MODERATE 35: CPT | Performed by: INTERNAL MEDICINE

## 2021-11-13 PROCEDURE — 83880 ASSAY OF NATRIURETIC PEPTIDE: CPT

## 2021-11-13 PROCEDURE — 0202U NFCT DS 22 TRGT SARS-COV-2: CPT

## 2021-11-13 PROCEDURE — 85025 COMPLETE CBC W/AUTO DIFF WBC: CPT

## 2021-11-13 PROCEDURE — 1200000000 HC SEMI PRIVATE

## 2021-11-13 PROCEDURE — 85379 FIBRIN DEGRADATION QUANT: CPT

## 2021-11-13 PROCEDURE — 84484 ASSAY OF TROPONIN QUANT: CPT

## 2021-11-13 PROCEDURE — 99285 EMERGENCY DEPT VISIT HI MDM: CPT

## 2021-11-13 PROCEDURE — 83690 ASSAY OF LIPASE: CPT

## 2021-11-13 PROCEDURE — 36415 COLL VENOUS BLD VENIPUNCTURE: CPT

## 2021-11-13 PROCEDURE — 71045 X-RAY EXAM CHEST 1 VIEW: CPT

## 2021-11-13 PROCEDURE — 83735 ASSAY OF MAGNESIUM: CPT

## 2021-11-13 PROCEDURE — 6370000000 HC RX 637 (ALT 250 FOR IP): Performed by: STUDENT IN AN ORGANIZED HEALTH CARE EDUCATION/TRAINING PROGRAM

## 2021-11-13 PROCEDURE — 93005 ELECTROCARDIOGRAM TRACING: CPT | Performed by: EMERGENCY MEDICINE

## 2021-11-13 PROCEDURE — 80053 COMPREHEN METABOLIC PANEL: CPT

## 2021-11-13 PROCEDURE — 96375 TX/PRO/DX INJ NEW DRUG ADDON: CPT

## 2021-11-13 PROCEDURE — 93005 ELECTROCARDIOGRAM TRACING: CPT | Performed by: STUDENT IN AN ORGANIZED HEALTH CARE EDUCATION/TRAINING PROGRAM

## 2021-11-13 PROCEDURE — 96374 THER/PROPH/DIAG INJ IV PUSH: CPT

## 2021-11-13 PROCEDURE — 6360000002 HC RX W HCPCS: Performed by: STUDENT IN AN ORGANIZED HEALTH CARE EDUCATION/TRAINING PROGRAM

## 2021-11-13 RX ORDER — SODIUM CHLORIDE 0.9 % (FLUSH) 0.9 %
10 SYRINGE (ML) INJECTION PRN
Status: DISCONTINUED | OUTPATIENT
Start: 2021-11-13 | End: 2021-11-13 | Stop reason: HOSPADM

## 2021-11-13 RX ORDER — ASPIRIN 81 MG/1
324 TABLET, CHEWABLE ORAL ONCE
Status: COMPLETED | OUTPATIENT
Start: 2021-11-13 | End: 2021-11-13

## 2021-11-13 RX ORDER — POTASSIUM CHLORIDE 7.45 MG/ML
10 INJECTION INTRAVENOUS PRN
Status: DISCONTINUED | OUTPATIENT
Start: 2021-11-13 | End: 2021-11-13 | Stop reason: HOSPADM

## 2021-11-13 RX ORDER — NITROGLYCERIN 0.4 MG/1
0.4 TABLET SUBLINGUAL EVERY 5 MIN PRN
Status: DISCONTINUED | OUTPATIENT
Start: 2021-11-13 | End: 2021-11-13 | Stop reason: HOSPADM

## 2021-11-13 RX ORDER — ACETAMINOPHEN 650 MG/1
650 SUPPOSITORY RECTAL EVERY 6 HOURS PRN
Status: DISCONTINUED | OUTPATIENT
Start: 2021-11-13 | End: 2021-11-13 | Stop reason: HOSPADM

## 2021-11-13 RX ORDER — ESCITALOPRAM OXALATE 10 MG/1
10 TABLET ORAL DAILY
COMMUNITY

## 2021-11-13 RX ORDER — POTASSIUM CHLORIDE 1500 MG/1
20 TABLET, FILM COATED, EXTENDED RELEASE ORAL
Qty: 30 TABLET | Refills: 5 | Status: SHIPPED | OUTPATIENT
Start: 2021-11-13

## 2021-11-13 RX ORDER — ONDANSETRON 4 MG/1
4 TABLET, ORALLY DISINTEGRATING ORAL EVERY 8 HOURS PRN
Status: DISCONTINUED | OUTPATIENT
Start: 2021-11-13 | End: 2021-11-13 | Stop reason: HOSPADM

## 2021-11-13 RX ORDER — ONDANSETRON 2 MG/ML
4 INJECTION INTRAMUSCULAR; INTRAVENOUS ONCE
Status: COMPLETED | OUTPATIENT
Start: 2021-11-13 | End: 2021-11-13

## 2021-11-13 RX ORDER — FLUDROCORTISONE ACETATE 0.1 MG/1
0.1 TABLET ORAL DAILY
COMMUNITY

## 2021-11-13 RX ORDER — SODIUM CHLORIDE 9 MG/ML
25 INJECTION, SOLUTION INTRAVENOUS PRN
Status: DISCONTINUED | OUTPATIENT
Start: 2021-11-13 | End: 2021-11-13 | Stop reason: HOSPADM

## 2021-11-13 RX ORDER — POTASSIUM CHLORIDE 20 MEQ/1
40 TABLET, EXTENDED RELEASE ORAL ONCE
Status: COMPLETED | OUTPATIENT
Start: 2021-11-13 | End: 2021-11-13

## 2021-11-13 RX ORDER — MAGNESIUM SULFATE 1 G/100ML
1000 INJECTION INTRAVENOUS PRN
Status: DISCONTINUED | OUTPATIENT
Start: 2021-11-13 | End: 2021-11-13 | Stop reason: HOSPADM

## 2021-11-13 RX ORDER — ATORVASTATIN CALCIUM 40 MG/1
40 TABLET, FILM COATED ORAL DAILY
Status: DISCONTINUED | OUTPATIENT
Start: 2021-11-13 | End: 2021-11-13 | Stop reason: HOSPADM

## 2021-11-13 RX ORDER — ONDANSETRON 2 MG/ML
4 INJECTION INTRAMUSCULAR; INTRAVENOUS EVERY 6 HOURS PRN
Status: DISCONTINUED | OUTPATIENT
Start: 2021-11-13 | End: 2021-11-13 | Stop reason: HOSPADM

## 2021-11-13 RX ORDER — ACETAMINOPHEN 325 MG/1
650 TABLET ORAL EVERY 6 HOURS PRN
Status: DISCONTINUED | OUTPATIENT
Start: 2021-11-13 | End: 2021-11-13 | Stop reason: HOSPADM

## 2021-11-13 RX ORDER — SODIUM CHLORIDE 0.9 % (FLUSH) 0.9 %
5-40 SYRINGE (ML) INJECTION EVERY 12 HOURS SCHEDULED
Status: DISCONTINUED | OUTPATIENT
Start: 2021-11-13 | End: 2021-11-13 | Stop reason: HOSPADM

## 2021-11-13 RX ORDER — FUROSEMIDE 10 MG/ML
20 INJECTION INTRAMUSCULAR; INTRAVENOUS ONCE
Status: DISCONTINUED | OUTPATIENT
Start: 2021-11-13 | End: 2021-11-13 | Stop reason: HOSPADM

## 2021-11-13 RX ORDER — FUROSEMIDE 10 MG/ML
20 INJECTION INTRAMUSCULAR; INTRAVENOUS ONCE
Status: COMPLETED | OUTPATIENT
Start: 2021-11-13 | End: 2021-11-13

## 2021-11-13 RX ORDER — LISINOPRIL AND HYDROCHLOROTHIAZIDE 25; 20 MG/1; MG/1
0.5 TABLET ORAL DAILY
Status: DISCONTINUED | OUTPATIENT
Start: 2021-11-13 | End: 2021-11-13 | Stop reason: HOSPADM

## 2021-11-13 RX ORDER — MORPHINE SULFATE 4 MG/ML
4 INJECTION, SOLUTION INTRAMUSCULAR; INTRAVENOUS ONCE
Status: COMPLETED | OUTPATIENT
Start: 2021-11-13 | End: 2021-11-13

## 2021-11-13 RX ORDER — POTASSIUM CHLORIDE 20 MEQ/1
40 TABLET, EXTENDED RELEASE ORAL PRN
Status: DISCONTINUED | OUTPATIENT
Start: 2021-11-13 | End: 2021-11-13 | Stop reason: HOSPADM

## 2021-11-13 RX ORDER — METOPROLOL SUCCINATE 50 MG/1
50 TABLET, EXTENDED RELEASE ORAL DAILY
Status: DISCONTINUED | OUTPATIENT
Start: 2021-11-13 | End: 2021-11-13 | Stop reason: HOSPADM

## 2021-11-13 RX ADMIN — ASPIRIN 324 MG: 81 TABLET, CHEWABLE ORAL at 02:40

## 2021-11-13 RX ADMIN — FUROSEMIDE 20 MG: 10 INJECTION, SOLUTION INTRAMUSCULAR; INTRAVENOUS at 05:34

## 2021-11-13 RX ADMIN — MORPHINE SULFATE 4 MG: 4 INJECTION INTRAVENOUS at 02:43

## 2021-11-13 RX ADMIN — POTASSIUM CHLORIDE 40 MEQ: 1500 TABLET, EXTENDED RELEASE ORAL at 04:09

## 2021-11-13 RX ADMIN — ONDANSETRON 4 MG: 2 INJECTION INTRAMUSCULAR; INTRAVENOUS at 02:43

## 2021-11-13 ASSESSMENT — PAIN SCALES - GENERAL
PAINLEVEL_OUTOF10: 9
PAINLEVEL_OUTOF10: 9
PAINLEVEL_OUTOF10: 0

## 2021-11-13 ASSESSMENT — PAIN DESCRIPTION - PAIN TYPE: TYPE: ACUTE PAIN

## 2021-11-13 ASSESSMENT — ENCOUNTER SYMPTOMS
VOMITING: 0
SHORTNESS OF BREATH: 0
DIARRHEA: 0
RHINORRHEA: 0
NAUSEA: 1
SORE THROAT: 0
COUGH: 0
CONSTIPATION: 0
ABDOMINAL PAIN: 0
BACK PAIN: 0

## 2021-11-13 ASSESSMENT — HEART SCORE: ECG: 0

## 2021-11-13 NOTE — ED PROVIDER NOTES
[facility]  ED  Emergency Department Encounter  Emergency Medicine Resident     Pt Name: Priyanka Shen  MRN: [id number]  [place] 1965  Date of evaluation: 11/13/21  PCP:  Janiya Rhodes MD    [address] [place] [address]       Chief Complaint   Patient presents with    Chest Pain     pt reports CP that radiates into L shoulder and neck; 9/10    Shortness of Breath     increasing SOB       HISTORY OFPRESENT ILLNESS  (Location/Symptom, Timing/Onset, Context/Setting, Quality, Duration, Modifying Factors,Severity.)      Priyanka Shen is a 49 y.o. female with past medical history of hyperlipidemia, hypertension and recurrent chest pain who presents with chest pain that began this morning and is lasted all day and left neck pain which woke her from sleep tonight. Patient reports that she has dull achy left-sided chest pain which began this morning. Patient states that she gets chronic chest pains and usually takes 324 mg of aspirin for her symptoms. She states this usually resolves her chest pain, however today it did not. She did not think much of her continued chest pain until she was woken from sleep with severe left neck pain. She describes the neck pain as pressure. She also reports numbness and tingling in her left arm, diaphoresis, nausea and 2 episodes of vomiting since waking from sleep with a neck pain. Patient also reports increasing shortness of breath. She denies fever, nose, rhinorrhea, congestion, cough, abdominal pain, diarrhea or urinary symptoms. Patient does note bilateral ankle swelling and she is unsure when this began. Patient does not follow with a cardiologist and has not had a stress test or echocardiogram.  She has been taking all of her blood pressure medications as prescribed. She reports a prior history of Covid and got her first Covid vaccine a few weeks ago. She states her symptoms today are not consistent with what she experienced with Covid.     PAST MEDICAL / SURGICAL / SOCIAL / FAMILY HISTORY      has a past medical history of Hyperlipidemia, Hypertension, and Ulcer. has a past surgical history that includes Tonsillectomy; Dental surgery; and pr incise finger tendon sheath (Right, 2/23/2017). Social:  reports that she has never smoked. She has never used smokeless tobacco. She reports that she does not drink alcohol and does not use drugs. Family Hx:   Family History   Problem Relation Age of Onset    Heart Disease Mother     Heart Disease Maternal Grandmother     Heart Disease Maternal Grandfather        Allergies:  Asa [aspirin] and Ibuprofen    Home Medications:  Prior to Admission medications    Medication Sig Start Date End Date Taking? Authorizing Provider   metoprolol succinate (TOPROL XL) 50 MG extended release tablet Take 1 tablet by mouth daily 12/1/20   Lalitha Dunlap MD   potassium chloride (KLOR-CON M) 20 MEQ TBCR extended release tablet Take 1 tablet by mouth daily (with breakfast) 11/18/20   Lalitha Dunlap MD   atorvastatin (LIPITOR) 40 MG tablet Take 40 mg by mouth daily    Historical Provider, MD   lisinopril-hydroCHLOROthiazide (PRINZIDE;ZESTORETIC) 20-25 MG per tablet Take 0.5 tablets by mouth daily 11/9/20   Lalitha Dunlap MD       REVIEW OFSYSTEMS    (2-9 systems for level 4, 10 or more for level 5)      Review of Systems   Constitutional: Positive for diaphoresis. Negative for chills and fever. HENT: Negative for congestion, rhinorrhea and sore throat. Eyes: Negative for visual disturbance. Respiratory: Negative for cough and shortness of breath. Cardiovascular: Positive for chest pain. Negative for leg swelling. Gastrointestinal: Positive for nausea. Negative for abdominal pain, constipation, diarrhea and vomiting. Genitourinary: Negative for dysuria, frequency and hematuria. Musculoskeletal: Positive for neck pain. Negative for arthralgias and back pain. Skin: Negative for rash.    Neurological: Positive for numbness. Negative for weakness, light-headedness and headaches. Psychiatric/Behavioral: Negative for confusion. All other systems reviewed and are negative. PHYSICAL EXAM   (up to 7 for level 4, 8 or more forlevel 5)      INITIAL VITALS:   Vitals:    11/13/21 0400 11/13/21 0402 11/13/21 0409 11/13/21 0417   BP:  129/74  113/70   Pulse:  67  63   Resp:  21  19   Temp:       TempSrc:       SpO2: 95% (!) 89% 94%    Weight:            Physical Exam  Vitals and nursing note reviewed. Constitutional:       General: She is not in acute distress. Appearance: She is not toxic-appearing. Comments: Adult female lying in stretcher awake and alert and in no acute distress. Speaks in full sentences and answers questions appropriately. She does appear uncomfortable   HENT:      Head: Normocephalic and atraumatic. Nose: Nose normal.      Mouth/Throat:      Mouth: Mucous membranes are moist.      Pharynx: Oropharynx is clear. Eyes:      Conjunctiva/sclera: Conjunctivae normal.      Pupils: Pupils are equal, round, and reactive to light. Neck:      Comments: No midline cervical spinal tenderness to palpation. No left lateral neck tenderness to palpation  Cardiovascular:      Rate and Rhythm: Normal rate and regular rhythm. Pulses: Normal pulses. Heart sounds: No murmur heard. No friction rub. No gallop. Pulmonary:      Effort: Pulmonary effort is normal. No respiratory distress. Breath sounds: Normal breath sounds. No wheezing, rhonchi or rales. Abdominal:      General: There is no distension. Palpations: Abdomen is soft. Tenderness: There is no abdominal tenderness. Musculoskeletal:      Cervical back: Neck supple. No rigidity or tenderness. Right lower leg: Edema present. Left lower leg: Edema present. Comments: 2+ pitting edema at the bilateral ankles   Skin:     General: Skin is warm and dry.       Capillary Refill: Capillary refill takes less than 2 Basophils 1 0 - 2 %    Immature Granulocytes 1 (H) 0 %    Segs Absolute 2.60 1.50 - 8.10 k/uL    Absolute Lymph # 1.68 1.10 - 3.70 k/uL    Absolute Mono # 0.45 0.10 - 1.20 k/uL    Absolute Eos # 0.28 0.00 - 0.44 k/uL    Basophils Absolute 0.04 0.00 - 0.20 k/uL    Absolute Immature Granulocyte 0.06 0.00 - 0.30 k/uL    WBC Morphology NOT REPORTED     RBC Morphology NOT REPORTED     Platelet Estimate NOT REPORTED    Troponin   Result Value Ref Range    Troponin, High Sensitivity <6 0 - 14 ng/L    Troponin T NOT REPORTED <0.03 ng/mL    Troponin Interp NOT REPORTED    Brain Natriuretic Peptide   Result Value Ref Range    Pro-BNP <20 <300 pg/mL    BNP Interpretation NOT REPORTED    Lipase   Result Value Ref Range    Lipase 30 13 - 60 U/L   Troponin   Result Value Ref Range    Troponin, High Sensitivity <6 0 - 14 ng/L    Troponin T NOT REPORTED <0.03 ng/mL    Troponin Interp NOT REPORTED    D-Dimer, Quantitative   Result Value Ref Range    D-Dimer, Quant 0.38 mg/L FEU   Magnesium   Result Value Ref Range    Magnesium 1.9 1.6 - 2.6 mg/dL   EKG 12 Lead   Result Value Ref Range    Ventricular Rate 58 BPM    Atrial Rate 58 BPM    P-R Interval 156 ms    QRS Duration 74 ms    Q-T Interval 450 ms    QTc Calculation (Bazett) 441 ms    P Axis 51 degrees    R Axis 31 degrees    T Axis 26 degrees         RADIOLOGY:  XR CHEST PORTABLE    Result Date: 11/13/2021  EXAMINATION: ONE XRAY VIEW OF THE CHEST 11/13/2021 2:49 am COMPARISON: 05/13/2015 HISTORY: ORDERING SYSTEM PROVIDED HISTORY: chest pain TECHNOLOGIST PROVIDED HISTORY: chest pain Reason for Exam: port uprt FINDINGS: There is mild cardiomegaly new since the comparison exam.  Pulmonary vasculature appears normal to slightly congested. The lungs are clear with low lung volumes. Surrounding osseous and soft tissue structures are unremarkable. New mild cardiomegaly and possible mild pulmonary vascular congestion since 05/13/2015.        EKG  Normal sinus rhythm, rate: 71  MI: 166  QRS: 96  QT/QTc: 408/443  No ST elevation or depression  No T wave abnormality    All EKG's are interpreted by the Emergency Department Physician who either signs or Co-signs this chart in the absence of a cardiologist.      MEDICATIONS ORDERED:  Orders Placed This Encounter   Medications    morphine injection 4 mg    ondansetron (ZOFRAN) injection 4 mg    aspirin chewable tablet 324 mg    potassium chloride (KLOR-CON M) extended release tablet 40 mEq    furosemide (LASIX) injection 20 mg         PROCEDURES:  None      CONSULTS:  IP CONSULT TO HOSPITALIST      EMERGENCY DEPARTMENT COURSE:  ED Course as of 11/13/21 0738   Sat Nov 13, 2021   0308 WBC: 5.1 [KA]   0328 Troponin, High Sensitivity: <6 [KA]   0328 Pro-BNP: <20 [KA]   0328 Potassium(!): 3.5  Replaced with 40 mg p.o. potassium [KA]   0400 Patient had O2 sats decrease to 88% on room air. She was placed on 4 L of nasal cannula with improvement to 92%. [KA]   [id number] D-Dimer, Quant: 0.38 [KA]   0430 XR CHEST PORTABLE  New mild cardiomegaly and possible mild pulmonary vascular congestion since  05/13/2015. Yana Yanez Given patient's new oxygen requirement, lower extremity edema and pulmonary edema on chest x-ray, will plan for admission to [facility]. Patient is comfortable with this plan. Patient was given 1 dose of IV Lasix. [KA]      ED Course User Index  [KA] Vignesh Garcia DO       FINAL IMPRESSION      1. Chest pain, unspecified type    2. Hypoxia          DISPOSITION / PLAN     DISPOSITION Admitted 11/13/2021 05:25:04 AM      PATIENT REFERRED TO:  No follow-up provider specified.     DISCHARGE MEDICATIONS:  New Prescriptions    No medications on file       Vignesh Garcia DO  Emergency Medicine Resident    (Please note that portions of this note were completed with a voice recognition program.Efforts were made to edit the dictations but occasionally words are mis-transcribed.)        Vignesh Garcia DO  Resident  11/13/21 5515

## 2021-11-13 NOTE — ED NOTES
Pt remains connected to cardiac monitor. Pt is watching TV, call light is within reach. No needs at this time.      Erasmo Veloz RN  11/13/21 0451

## 2021-11-13 NOTE — ED NOTES
Pt continues to sat at 88% on 1L, pt placed on 2L NC sating between 90-91%. Pt and partner deny any needs.      Iain Ornelas RN  11/13/21 6185

## 2021-11-13 NOTE — DISCHARGE SUMMARY
[facility]  Office: [address], DO, Tamica Polo, DO, Brandon Singh, DO, Luzma Darren Blood, DO, Korina Marte MD, Tati Gutierrez MD, Abby Cook MD, Babs Boggs MD, Laure Portillo MD, Vic Vale MD, Nelson Brothers MD, Hannah Street, DO, Millicent Teixeira, DO, Torey Brooks MD,  Beatriz Doan DO, Regine Fish MD, Benitez Lazcano MD, Alex Jc MD, Manav Prince MD, Marlene Cosby MD, Dianna Vega MD, Kaylah Amador MD, Birgit Forrester [facility], [facility], CNP, Rubio Hernández, CNP, [facility] , CNS, Diane Santana, CNP, Chelo De La O, CNP, Tawanna Medrano, CNP, Bob Manrique, CNP, Tommy Saleh, CNP, Reyna Gomez PA-C, Cheri Goddard, [facility], Claudio Flores, CNP, Delmar Pisano, CNP, Solo Braden, CNP, Amanuel Lepe, CNP, Jose Ramon Ndiaye, CNP, Robert Cannon, CNP, Thais Ortega, [facility]    Discharge Summary     Patient ID: Brianna Swain  :  1965   MRN: [id number]     ACCOUNT:  [de-identified]   Patient's PCP: Gudelia Hunt MD  Admit Date: 2021   Discharge Date: 2021   Length of Stay: 1  Code Status:  Full Code  Admitting Physician: Lenn Rubinstein, MD  Discharge Physician: Lenn Rubinstein, MD     Active Discharge Diagnoses:     Hospital Problem Lists:  Principal Problem:    Atypical chest pain  Active Problems: Morbid obesity ([address]    Hypertensive disorder    Hyperlipidemia    Heartburn    History of COVID-19  Resolved Problems:    * No resolved hospital problems. *      Admission Condition:  fair     Discharged Condition: stable    Hospital Stay:   Admitting history:  Ronnell Loredo is a 49 y. o. female with past medical history of hyperlipidemia, hypertension and recurrent chest pain who presents with chest pain that began this morning and is lasted all day and left neck pain which woke her from sleep tonight.  Patient reports that she has dull achy left-sided chest pain which began this morning.  Patient states that she gets chronic chest pains and usually takes 324 mg of aspirin for her symptoms.  She states this usually resolves her chest pain, however today it did not.  She did not think much of her continued chest pain until she was woken from sleep with severe left neck pain.  She describes the neck pain as pressure.  She also reports numbness and tingling in her left arm, diaphoresis, nausea and 2 episodes of vomiting since waking from sleep with a neck pain.  Patient also reports increasing shortness of breath.  She denies fever, nose, rhinorrhea, congestion, cough, abdominal pain, diarrhea or urinary symptoms.  Patient does note bilateral ankle swelling and she is unsure when this began.  Patient does not follow with a cardiologist and has not had a stress test or echocardiogram. Talon Salazar has been taking all of her blood pressure medications as prescribed.  She reports a prior history of Covid and got her first Covid vaccine a few weeks ago. Talon Salazar states her symptoms today are not consistent with what she experienced with Covid     Currently patient is denying chest pain which according to her has resolved  Denies feeling short of breath on walking  Troponin was negative, proBNP less than 20    Hospital Course:    Patient remained chest pain-free on telemetry floor  D-dimer was negative  CT chest without contrast showed only scattered areas of atelectasis/scarring possibly sequelae of prior pulmonary disease  Respiratory molecular virus panel came negative       Plan:         1. Diclofenac gel locally for reproducible/musculoskeletal chest wall pain  2. Continue home blood pressure medicines  3. Discussed to avoid taking NSAIDs due to history of peptic ulcer  4. Discussed with clinic OTC Pepcid/Zantac as needed for heartburn/acid reflux  5.  Discharge home        Significant therapeutic interventions: Supportive and symptomatic    Significant Diagnostic Studies:   Labs / Micro:  CBC:   Lab Results   Component Value Date    WBC 5.1 11/13/2021    RBC 4.95 11/13/2021    HGB 14.3 11/13/2021    HCT 43.3 11/13/2021    MCV 87.5 11/13/2021    MCH 28.9 11/13/2021    MCHC 33.0 11/13/2021    RDW 13.0 11/13/2021     11/13/2021     BMP:    Lab Results   Component Value Date    GLUCOSE 123 11/13/2021     11/13/2021    K 3.5 11/13/2021     11/13/2021    CO2 22 11/13/2021    ANIONGAP 13 11/13/2021    BUN 18 11/13/2021    CREATININE 0.75 11/13/2021    BUNCRER NOT REPORTED 11/13/2021    CALCIUM 8.8 11/13/2021    LABGLOM >60 11/13/2021    GFRAA >60 11/13/2021    GFR      11/13/2021    GFR NOT REPORTED 11/13/2021     HFP:    Lab Results   Component Value Date    PROT 7.3 11/13/2021     CMP:    Lab Results   Component Value Date    GLUCOSE 123 11/13/2021     11/13/2021    K 3.5 11/13/2021     11/13/2021    CO2 22 11/13/2021    BUN 18 11/13/2021    CREATININE 0.75 11/13/2021    ANIONGAP 13 11/13/2021    ALKPHOS 108 11/13/2021    ALT 42 11/13/2021    AST 24 11/13/2021    BILITOT 0.49 11/13/2021    LABALBU 4.1 11/13/2021    ALBUMIN 1.3 11/13/2021    LABGLOM >60 11/13/2021    GFRAA >60 11/13/2021    GFR      11/13/2021    GFR NOT REPORTED 11/13/2021    PROT 7.3 11/13/2021    CALCIUM 8.8 11/13/2021     PT/INR:  No results found for: PTINR, PROTIME, INR  PTT: No results found for: APTT  FLP:    Lab Results   Component Value Date    CHOL 111 11/11/2020    TRIG 116 11/11/2020    HDL 28 08/13/2021     U/A:    Lab Results   Component Value Date    COLORU YELLOW 07/08/2020    TURBIDITY TURBID 07/08/2020    SPECGRAV 1.024 07/08/2020    HGBUR NEGATIVE 07/08/2020    PHUR 5.0 07/08/2020    PROTEINU NEGATIVE 07/08/2020    GLUCOSEU NEGATIVE 07/08/2020    KETUA NEGATIVE 07/08/2020    BILIRUBINUR NEGATIVE 07/08/2020    UROBILINOGEN Normal 07/08/2020    NITRU NEGATIVE 07/08/2020    LEUKOCYTESUR NEGATIVE 07/08/2020     TSH:    Lab Results   Component Value Date    TSH 2.64 08/13/2021        Radiology:  CT CHEST WO CONTRAST    Result Date: 11/13/2021  1. No acute cardiopulmonary process is identified. Scattered areas of atelectasis/scarring possibly a sequela of the patient's prior COVID disease. 2. Cardiomegaly. 3. Hepatic steatosis. XR CHEST PORTABLE    Result Date: 11/13/2021  New mild cardiomegaly and possible mild pulmonary vascular congestion since 05/13/2015. Consultations:    Consults:     Final Specialist Recommendations/Findings:   IP CONSULT TO HOSPITALIST      The patient was seen and examined on day of discharge and this discharge summary is in conjunction with any daily progress note from day of discharge. Discharge plan:     Disposition: Home    Physician Follow Up: Kristine Yoder MD  [address] [facility] [address]  [phone]             Requiring Further Evaluation/Follow Up POST HOSPITALIZATION/Incidental Findings: Use diclofenac gel locally for reproducible chest wall pain    Diet: cardiac diet    Activity: As tolerated    Instructions to Patient: Avoid NSAIDs with history of gastric ulcer in the past    Discharge Medications:      Medication List      START taking these medications    diclofenac sodium 1 % Gel  Commonly known as: VOLTAREN  Apply 2 g topically 2 times daily as needed for Pain        CONTINUE taking these medications    atorvastatin 40 MG tablet  Commonly known as: LIPITOR     escitalopram 10 MG tablet  Commonly known as: LEXAPRO     fludrocortisone 0.1 MG tablet  Commonly known as: FLORINEF     lisinopril-hydroCHLOROthiazide 20-25 MG per tablet  Commonly known as: PRINZIDE;ZESTORETIC     metoprolol succinate 50 MG extended release tablet  Commonly known as:  Toprol XL  Take 1 tablet by mouth daily     potassium chloride 20 MEQ Tbcr extended release tablet  Commonly known as: KLOR-CON M  Take 1 tablet by mouth daily (with breakfast)           Where to Get Your Medications      These medications were sent to [address] [place] [address]

## 2021-11-13 NOTE — H&P
her symptoms. She states this usually resolves her chest pain, however today it did not. She did not think much of her continued chest pain until she was woken from sleep with severe left neck pain. She describes the neck pain as pressure. She also reports numbness and tingling in her left arm, diaphoresis, nausea and 2 episodes of vomiting since waking from sleep with a neck pain. Patient also reports increasing shortness of breath. She denies fever, nose, rhinorrhea, congestion, cough, abdominal pain, diarrhea or urinary symptoms. Patient does note bilateral ankle swelling and she is unsure when this began. Patient does not follow with a cardiologist and has not had a stress test or echocardiogram.  She has been taking all of her blood pressure medications as prescribed. She reports a prior history of Covid and got her first Covid vaccine a few weeks ago. She states her symptoms today are not consistent with what she experienced with Covid    Currently patient is denying chest pain which according to her has resolved  Denies feeling short of breath on walking  Troponin was negative, proBNP less than 20  Past Medical History:     Past Medical History:   Diagnosis Date    Hyperlipidemia     Hypertension     Ulcer         Past Surgical History:     Past Surgical History:   Procedure Laterality Date    DENTAL SURGERY      GA INCISE FINGER TENDON SHEATH Right 2/23/2017    RELEASE A-PULLEY THUMB  performed by Keyana Byrd MD at [address] [place] [address]          Medications Prior to Admission:     Prior to Admission medications    Medication Sig Start Date End Date Taking?  Authorizing Provider   fludrocortisone (FLORINEF) 0.1 MG tablet Take 0.1 mg by mouth daily   Yes Historical Provider, MD   escitalopram (LEXAPRO) 10 MG tablet Take 10 mg by mouth daily   Yes Historical Provider, MD   potassium chloride (KLOR-CON M) 20 MEQ TBCR extended release tablet Take 1 tablet by mouth daily (with breakfast) 11/13/21  Yes Lenn Rubinstein, MD   diclofenac sodium (VOLTAREN) 1 % GEL Apply 2 g topically 2 times daily as needed for Pain 11/13/21  Yes Lenn Rubinstein, MD   metoprolol succinate (TOPROL XL) 50 MG extended release tablet Take 1 tablet by mouth daily 12/1/20   Jimenez Galindo MD   atorvastatin (LIPITOR) 40 MG tablet Take 40 mg by mouth daily    Historical Provider, MD   lisinopril-hydroCHLOROthiazide (PRINZIDE;ZESTORETIC) 20-25 MG per tablet Take by mouth daily  11/9/20   Jimenez Galindo MD        Allergies:     Asa [aspirin] and Ibuprofen    Social History:     Tobacco:    reports that she has never smoked. She has never used smokeless tobacco.  Alcohol:      reports no history of alcohol use. Drug Use:  reports no history of drug use. Family History:     Family History   Problem Relation Age of Onset    Heart Disease Mother     Heart Disease Maternal Grandmother     Heart Disease Maternal Grandfather        Review of Systems:     Positive and Negative as described in HPI.     CONSTITUTIONAL:  negative for fevers, chills, sweats, fatigue, weight loss  HEENT:  negative for vision, hearing changes, runny nose, throat pain  RESPIRATORY:  negative for shortness of breath, cough, congestion, wheezing  CARDIOVASCULAR:  negative for chest pain, palpitations  GASTROINTESTINAL:  negative for nausea, vomiting, diarrhea, constipation, change in bowel habits, abdominal pain   GENITOURINARY:  negative for difficulty of urination, burning with urination, frequency   INTEGUMENT:  negative for rash, skin lesions, easy bruising   HEMATOLOGIC/LYMPHATIC:  negative for swelling/edema   ALLERGIC/IMMUNOLOGIC:  negative for urticaria , itching  ENDOCRINE:  negative increase in drinking, increase in urination, hot or cold intolerance  MUSCULOSKELETAL:  negative joint pains, muscle aches, swelling of joints  NEUROLOGICAL:  negative for headaches, dizziness, lightheadedness, numbness, pain, tingling extremities  BEHAVIOR/PSYCH:  negative for depression, anxiety    Physical Exam:   BP (!) 103/49   Pulse 60   Temp 97.4 °F (36.3 °C) (Oral)   Resp 20   Ht 5' 6\" (1.676 m)   Wt 282 lb 8 oz (128.1 kg)   SpO2 98%   BMI 45.60 kg/m²   Temp (24hrs), Av.2 °F (36.2 °C), Min:97 °F (36.1 °C), Max:97.4 °F (36.3 °C)    No results for input(s): POCGLU in the last 72 hours.   No intake or output data in the 24 hours ending 21 1341    General Appearance: alert, well appearing, and in no acute distress  Mental status: oriented to person, place, and time  Head: normocephalic, atraumatic  Eye: no icterus, redness, pupils equal and reactive, extraocular eye movements intact, conjunctiva clear  Ear: normal external ear, no discharge, hearing intact  Nose: no drainage noted  Mouth: mucous membranes moist  Neck: supple, no carotid bruits, thyroid not palpable  Lungs: Bilateral equal air entry, clear to ausculation, no wheezing, rales or rhonchi, normal effort  Cardiovascular:+ Reproducible chest pain at left sternal border, normal rate, regular rhythm, no murmur, gallop, rub  Abdomen: Soft, nontender, nondistended, normal bowel sounds, no hepatomegaly or splenomegaly  Neurologic: There are no new focal motor or sensory deficits, normal muscle tone and bulk, no abnormal sensation, normal speech, cranial nerves II through XII grossly intact  Skin: No gross lesions, rashes, bruising or bleeding on exposed skin area  Extremities: peripheral pulses palpable, no pedal edema or calf pain with palpation  Psych: normal affect    Investigations:      Laboratory Testing:  Recent Results (from the past 24 hour(s))   EKG 12 Lead    Collection Time: 21  2:08 AM   Result Value Ref Range    Ventricular Rate 71 BPM    Atrial Rate 71 BPM    P-R Interval 166 ms    QRS Duration 96 ms    Q-T Interval 408 ms    QTc Calculation (Bazett) 443 ms    P Axis 61 degrees    R Axis 52 degrees    T Axis 39 degrees   Comprehensive Metabolic Panel w/ Reflex to MG    Collection Time: 11/13/21  2:42 AM   Result Value Ref Range    Glucose 123 (H) 70 - 99 mg/dL    BUN 18 6 - 20 mg/dL    CREATININE 0.75 0.50 - 0.90 mg/dL    Bun/Cre Ratio NOT REPORTED 9 - 20    Calcium 8.8 8.6 - 10.4 mg/dL    Sodium 140 135 - 144 mmol/L    Potassium 3.5 (L) 3.7 - 5.3 mmol/L    Chloride 105 98 - 107 mmol/L    CO2 22 20 - 31 mmol/L    Anion Gap 13 9 - 17 mmol/L    Alkaline Phosphatase 108 (H) 35 - 104 U/L    ALT 42 (H) 5 - 33 U/L    AST 24 <32 U/L    Total Bilirubin 0.49 0.3 - 1.2 mg/dL    Total Protein 7.3 6.4 - 8.3 g/dL    Albumin 4.1 3.5 - 5.2 g/dL    Albumin/Globulin Ratio 1.3 1.0 - 2.5    GFR Non-African American >60 >60 mL/min    GFR African American >60 >60 mL/min    GFR Comment          GFR Staging NOT REPORTED    CBC Auto Differential    Collection Time: 11/13/21  2:42 AM   Result Value Ref Range    WBC 5.1 3.5 - 11.3 k/uL    RBC 4.95 3.95 - 5.11 m/uL    Hemoglobin 14.3 11.9 - 15.1 g/dL    Hematocrit 43.3 36.3 - 47.1 %    MCV 87.5 82.6 - 102.9 fL    MCH 28.9 25.2 - 33.5 pg    MCHC 33.0 28.4 - 34.8 g/dL    RDW 13.0 11.8 - 14.4 %    Platelets 896 939 - 178 k/uL    MPV 10.0 8.1 - 13.5 fL    NRBC Automated 0.0 0.0 per 100 WBC    Differential Type NOT REPORTED     Seg Neutrophils 50 36 - 65 %    Lymphocytes 33 24 - 43 %    Monocytes 9 3 - 12 %    Eosinophils % 6 (H) 1 - 4 %    Basophils 1 0 - 2 %    Immature Granulocytes 1 (H) 0 %    Segs Absolute 2.60 1.50 - 8.10 k/uL    Absolute Lymph # 1.68 1.10 - 3.70 k/uL    Absolute Mono # 0.45 0.10 - 1.20 k/uL    Absolute Eos # 0.28 0.00 - 0.44 k/uL    Basophils Absolute 0.04 0.00 - 0.20 k/uL    Absolute Immature Granulocyte 0.06 0.00 - 0.30 k/uL    WBC Morphology NOT REPORTED     RBC Morphology NOT REPORTED     Platelet Estimate NOT REPORTED    Troponin    Collection Time: 11/13/21  2:42 AM   Result Value Ref Range    Troponin, High Sensitivity <6 0 - 14 ng/L    Troponin T NOT REPORTED <0.03 ng/mL    Troponin Interp NOT REPORTED    Brain Natriuretic Peptide    Collection Time: 11/13/21  2:42 AM   Result Value Ref Range    Pro-BNP <20 <300 pg/mL    BNP Interpretation NOT REPORTED    Lipase    Collection Time: 11/13/21  2:42 AM   Result Value Ref Range    Lipase 30 13 - 60 U/L   Magnesium    Collection Time: 11/13/21  2:42 AM   Result Value Ref Range    Magnesium 1.9 1.6 - 2.6 mg/dL   EKG 12 Lead    Collection Time: 11/13/21  3:59 AM   Result Value Ref Range    Ventricular Rate 58 BPM    Atrial Rate 58 BPM    P-R Interval 156 ms    QRS Duration 74 ms    Q-T Interval 450 ms    QTc Calculation (Bazett) 441 ms    P Axis 51 degrees    R Axis 31 degrees    T Axis 26 degrees   Troponin    Collection Time: 11/13/21  4:04 AM   Result Value Ref Range    Troponin, High Sensitivity <6 0 - 14 ng/L    Troponin T NOT REPORTED <0.03 ng/mL    Troponin Interp NOT REPORTED    D-Dimer, Quantitative    Collection Time: 11/13/21  4:04 AM   Result Value Ref Range    D-Dimer, Quant 0.38 mg/L FEU   Respiratory Panel, Molecular, with COVID-19 (Restricted: peds pts or suitable admitted adults)    Collection Time: 11/13/21  7:45 AM    Specimen: Nasopharyngeal Swab   Result Value Ref Range    Specimen Description . NASOPHARYNGEAL SWAB     Adenovirus PCR Not Detected Not Detected    Coronavirus 229E PCR Not Detected Not Detected    Coronavirus HKU1 PCR Not Detected Not Detected    Coronavirus NL63 PCR Not Detected Not Detected    Coronavirus OC43 PCR Not Detected Not Detected    SARS-CoV-2, PCR Not Detected Not Detected    Human Metapneumovirus PCR Not Detected Not Detected    Rhino/Enterovirus PCR Not Detected Not Detected    Influenza A by PCR Not Detected Not Detected    Influenza A H1 PCR NOT REPORTED Not Detected    Influenza A H1 (2009) PCR NOT REPORTED Not Detected    Influenza A H3 PCR NOT REPORTED Not Detected    Influenza B by PCR Not Detected Not Detected    Parainfluenza 1 PCR Not Detected Not Detected    Parainfluenza 2 PCR Not Detected Not Detected    Parainfluenza 3 PCR Not Detected Not Detected    Parainfluenza 4 PCR Not Detected Not Detected    Resp Syncytial Virus PCR Not Detected Not Detected    Bordetella Parapertussis Not Detected Not Detected    B Pertussis by PCR Not Detected Not Detected    Chlamydia pneumoniae By PCR Not Detected Not Detected    Mycoplasma pneumo by PCR Not Detected Not Detected   Troponin    Collection Time: 11/13/21 12:31 PM   Result Value Ref Range    Troponin, High Sensitivity <6 0 - 14 ng/L    Troponin T NOT REPORTED <0.03 ng/mL    Troponin Interp NOT REPORTED        Imaging/Diagnostics:  XR CHEST PORTABLE    Result Date: 11/13/2021  New mild cardiomegaly and possible mild pulmonary vascular congestion since 05/13/2015. Assessment :      Hospital Problems           Last Modified POA    * (Principal) Atypical chest pain 11/13/2021 Yes    Morbid obesity ([address] (Chronic) 11/13/2021 Yes    Hypertensive disorder (Chronic) 11/13/2021 Yes    Hyperlipidemia (Chronic) 11/13/2021 Yes    Heartburn (Chronic) 11/13/2021 Yes    History of COVID-19 11/13/2021 Yes          Plan:     Patient status observation in the Progressive Unit/Step down    1. D-dimer is negative  2. Will get CT chest to evaluate with history of Covid in recent past  3. Respiratory virus panel  4. Diclofenac gel locally  5. Continue home blood pressure medicines  6. Discussed to avoid taking NSAIDs due to history of peptic ulcer    Consultations:   IP CONSULT TO HOSPITALIST     Patient is admitted as inpatient status because of co-morbidities listed above, severity of signs and symptoms as outlined, requirement for current medical therapies and most importantly because of direct risk to patient if care not provided in a hospital setting. Expected length of stay > 48 hours.     Anne-Marie Almanza MD  11/13/2021  1:41 PM    Copy sent to Dr. Morales Rivero MD

## 2021-11-13 NOTE — ED NOTES
Pt remains in bed, partner at bedside, pt remains connected to cardiac monitor. Pt was sating at 87-89% on RA, pt placed on 1L NC. Pt and partner denies any needs. Pt's partner appears restless and anxious exhaling audibly. Pt and partner continue to watch TV.      Martin Ray RN  11/13/21 [id number]

## 2021-11-13 NOTE — ED NOTES
Pt remains in room, partner at bedside. Pt continues to be connect to caridac monitor and watching TV.       Efra Luciano RN  11/13/21 8143

## 2021-11-13 NOTE — ED NOTES
Pt is resting comfortably connected to cardiac monitor, and pulse ox, and 2L NC. No distress noted.      Efra Luciano RN  11/13/21 4830

## 2021-11-13 NOTE — ED PROVIDER NOTES
[facility]     Emergency Department     Faculty Attestation    I performed a history and physical examination of the patient and discussed management with the resident. I have reviewed and agree with the residents findings including all diagnostic interpretations, and treatment plans as written. Any areas of disagreement are noted on the chart. I was personally present for the key portions of any procedures. I have documented in the chart those procedures where I was not present during the key portions. I have reviewed the emergency nurses triage note. I agree with the chief complaint, past medical history, past surgical history, allergies, medications, social and family history as documented unless otherwise noted below. Documentation of the HPI, Physical Exam and Medical Decision Making performed by [email or web address] is based on my personal performance of the HPI, PE and MDM. For Physician Assistant/ Nurse Practitioner cases/documentation I have personally evaluated this patient and have completed at least one if not all key elements of the E/M (history, physical exam, and MDM). Additional findings are as noted. 48 yo F cp radiating to L jaw & L arm, & nausea, no vomit, no injury, denies diaphoresis, hx covid 1 month prior,   pe sat 88%, abdomen non tender, no distension, no rigidity,   No calf tenderness, 1-2 + calf edema,     cxr congestion & cardiomegaly, d dimer-, trop <6, admitted,     EKG Interpretation    Interpreted by me  Normal sinus, heart rate 71, no ischemia, normal axis, QT corrected 443    -sinus bradycardia, hr 58, no ischemia, normal axis, qtc 441    CRITICAL CARE: There was a high probability of clinically significant/life threatening deterioration in this patient's condition which required my urgent intervention. Total critical care time was 5 minutes. This excludes any time for separately reportable procedures.        Mckenna Castro DO Roselyn Wayne, DO  11/13/21 0326       Roselyn Wayne, DO  11/13/21 [facility], DO  11/13/21 5654

## 2021-11-13 NOTE — ED NOTES
Pt got up and walked to bathroom with a even and steady gait.      Jose Garcia, NEYDA  11/13/21 8117

## 2021-11-13 NOTE — ED NOTES
The following labs were labeled with patient stickers & tubed to lab;    []Lavender   []On Ice  [x]Blue  [x]Green/ Yellow  []Green/ Black []On Ice  []Pink  []Red  []Yellow    []COVID-19 Swab []Rapid  []COVID-19 Swab []PCR    []Urine Sample  []Pelvic Cultures    []Blood Cultures       Martin Ray RN  11/13/21 042

## 2022-12-14 ENCOUNTER — HOSPITAL ENCOUNTER (OUTPATIENT)
Age: 57
Setting detail: SPECIMEN
Discharge: HOME | End: 2022-12-14

## 2022-12-15 LAB — HCG QUANTITATIVE: <1 MIU/ML

## 2023-07-27 ENCOUNTER — HOSPITAL ENCOUNTER (EMERGENCY)
Age: 58
Discharge: HOME | End: 2023-07-27
Attending: EMERGENCY MEDICINE
Payer: COMMERCIAL

## 2023-07-27 ENCOUNTER — APPOINTMENT (OUTPATIENT)
Dept: GENERAL RADIOLOGY | Age: 58
End: 2023-07-27
Payer: COMMERCIAL

## 2023-07-27 VITALS
HEIGHT: 67 IN | WEIGHT: 288 LBS | OXYGEN SATURATION: 96 % | DIASTOLIC BLOOD PRESSURE: 89 MMHG | SYSTOLIC BLOOD PRESSURE: 152 MMHG | TEMPERATURE: 97.9 F | RESPIRATION RATE: 20 BRPM | HEART RATE: 76 BPM | BODY MASS INDEX: 45.2 KG/M2

## 2023-07-27 DIAGNOSIS — M25.552 LEFT HIP PAIN: Primary | ICD-10-CM

## 2023-07-27 PROCEDURE — 72100 X-RAY EXAM L-S SPINE 2/3 VWS: CPT

## 2023-07-27 PROCEDURE — 73502 X-RAY EXAM HIP UNI 2-3 VIEWS: CPT

## 2023-07-27 PROCEDURE — 99284 EMERGENCY DEPT VISIT MOD MDM: CPT

## 2023-07-27 PROCEDURE — 6360000002 HC RX W HCPCS: Performed by: EMERGENCY MEDICINE

## 2023-07-27 PROCEDURE — 6370000000 HC RX 637 (ALT 250 FOR IP): Performed by: EMERGENCY MEDICINE

## 2023-07-27 PROCEDURE — 96372 THER/PROPH/DIAG INJ SC/IM: CPT

## 2023-07-27 RX ORDER — ORPHENADRINE CITRATE 30 MG/ML
60 INJECTION INTRAMUSCULAR; INTRAVENOUS ONCE
Status: DISCONTINUED | OUTPATIENT
Start: 2023-07-27 | End: 2023-07-28 | Stop reason: HOSPADM

## 2023-07-27 RX ORDER — KETOROLAC TROMETHAMINE 30 MG/ML
30 INJECTION, SOLUTION INTRAMUSCULAR; INTRAVENOUS ONCE
Status: COMPLETED | OUTPATIENT
Start: 2023-07-27 | End: 2023-07-27

## 2023-07-27 RX ORDER — CYCLOBENZAPRINE HCL 5 MG
5 TABLET ORAL 2 TIMES DAILY PRN
Qty: 10 TABLET | Refills: 0 | Status: SHIPPED | OUTPATIENT
Start: 2023-07-27 | End: 2023-08-06

## 2023-07-27 RX ORDER — LIDOCAINE 4 G/G
1 PATCH TOPICAL ONCE
Status: DISCONTINUED | OUTPATIENT
Start: 2023-07-27 | End: 2023-07-28 | Stop reason: HOSPADM

## 2023-07-27 RX ADMIN — KETOROLAC TROMETHAMINE 30 MG: 30 INJECTION, SOLUTION INTRAMUSCULAR; INTRAVENOUS at 20:50

## 2023-07-27 ASSESSMENT — PAIN SCALES - GENERAL
PAINLEVEL_OUTOF10: 10
PAINLEVEL_OUTOF10: 10

## 2023-07-27 ASSESSMENT — PAIN DESCRIPTION - DESCRIPTORS
DESCRIPTORS: ACHING
DESCRIPTORS: ACHING

## 2023-07-27 ASSESSMENT — PAIN DESCRIPTION - LOCATION
LOCATION: HIP
LOCATION: HIP

## 2023-07-27 ASSESSMENT — PAIN DESCRIPTION - ORIENTATION
ORIENTATION: LEFT
ORIENTATION: LEFT

## 2023-07-27 NOTE — ED PROVIDER NOTES
[facility], [place], operated by [facility] ED  Emergency Department Encounter  Emergency Medicine Resident     Pt Charan Bocanegra  MRN: [id number]  [address] [facility] [place] 1965  Date of evaluation: 7/27/23  PCP:  Darylene Starks, MD  Note Started: 7:44 PM EDT      CHIEF COMPLAINT       Chief Complaint   Patient presents with    Leg Pain    Hip Pain       HISTORY OF PRESENT ILLNESS  (Location/Symptom, Timing/Onset, Context/Setting, Quality, Duration, Modifying Factors, Severity.)      Jason Carrillo is a 66 y.o. female who presents with left hip pain for the last week. Patient states that last Wednesday she was at the splash pad when she slipped and she fell. She denies hitting her head, no loss of consciousness. Patient states that she has been generally sore since that time but improving. She states that her left hip still has continued to painful. 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 PAST MEDICAL / SURGICAL / SOCIAL / FAMILY HISTORY      has a past medical history of Hyperlipidemia, Hypertension, and Ulcer. has a past surgical history that includes Tonsillectomy; Dental surgery; and pr tendon sheath incision (Right, 2/23/2017).       Social History     Socioeconomic History    Marital status: Single     Spouse name: Not on file    Number of children: Not on file    Years of education: Not on file    Highest education level: Not on file   Occupational History    Not on file   Tobacco Use    Smoking status: Never    Smokeless tobacco: Never   Vaping Use    Vaping Use: Never used   Substance and Sexual Activity    Alcohol use: No    Drug use: No    Sexual activity: Not on file   Other Topics Concern    Not on file   Social History Narrative    Not on file     Social Determinants of Health     Financial Resource Strain: Not on file   Food Insecurity: Not on file   Transportation Needs: Not on file   Physical Activity: Not on file   Stress: Not on file   Social

## 2023-07-27 NOTE — ED TRIAGE NOTES
Mode of arrival (squad #, walk in, police, etc) : walk-in        Chief complaint(s): left sided hip/leg pain        Arrival Note (brief scenario, treatment PTA, etc). : Pt went to a splash pad on Wednesday and fell. Pt feels like her leg feels infected inside of it or broken. She states it hurts all the way from her left hip down to her foot as well as her butt. C= \"Have you ever felt that you should Cut down on your drinking? \"  No  A= \"Have people Annoyed you by criticizing your drinking? \"  No  G= \"Have you ever felt bad or Guilty about your drinking? \"  No  E= \"Have you ever had a drink as an Eye-opener first thing in the morning to steady your nerves or to help a hangover? \"  No      Deferred []      Reason for deferring: N/A    *If yes to two or more: probable alcohol abuse. *

## 2023-07-28 ASSESSMENT — ENCOUNTER SYMPTOMS: BACK PAIN: 0

## 2023-07-28 NOTE — ED PROVIDER NOTES
EMERGENCY DEPARTMENT ENCOUNTER   ATTENDING ATTESTATION     Pt Name: Italo Hernandez  MRN: [id number]  [address] [place] 1965  Date of evaluation: 7/27/23       Italo Hernandez is a 66 y.o. female who presents with Leg Pain and Hip Pain      MDM: 53-year-old female presents for complaints of left hip and buttock pain. Patient reports that she was at a splash pad with her grandchildren a couple days ago slipped on the water fell onto her left hip and buttock area. Reports since that time she been having pain. Worse with movements. She denies anything making it significantly better states that she is tried some over-the-counter things. She denies any new numbness tingling or weakness to the extremity denies any bowel or bladder incontinence or saddle paresthesias. Denies any associated abdominal pain nausea or vomiting. Denies any fevers or chills. On initial exam patient no acute distress vitals are stable, tenderness to the left hip and over the left sciatic area, 5 out of 5 strength in lower extremities, sensation intact to the foot and leg, +2 DP pulses    We will check imaging provide symptomatic treatment    Imaging was negative for bony injury    Suspect likely contusion    Patient was reevaluated reports pain is somewhat improved    Given that she has no acute bony injuries improvement of her pain no new neurodeficits feel she is stable for discharge home    Results were discussed with patient discussed supportive care and need for follow-up with PCP and return precautions, patient voiced understanding comfortable with plan and discharge    Patient/Guardian was informed of their diagnosis and told to follow up with PCP  in 1-3 days. Patient demonstrates understanding and agreement with the plan. They were given the opportunity to ask questions and those questions were answered to the best of our ability with the available information.  Patient/Guardian told to return to the ED for any new, worsening, changing or

## 2023-07-28 NOTE — DISCHARGE INSTRUCTIONS
You were seen today for hip pain after a fall last week. We did x-rays of your hip and pelvis along with your lower back and there are no fractures or dislocations. You likely have a contusion and possibly strain of the muscle. Continue to use Tylenol and Motrin as able. You can use ice and heat for comfort as well. I have also included some stretches for you to do. If your pain continues please follow-up with your primary care doctor. If you notice any concerning symptoms please return to the ER immediately. These can include but are not limited to: fevers, chills, shortness of breath, vomiting, weakness of the extremities, changes in your mental status, numbness, pale extremities, or chest pain. Activity: resume activity as tolerated. Medications: Continue taking your home medications as previously directed. For pain You may take tylenol 1,000mg by mouth every 6 hours as needed for pain. Do not exceed 4,000mg per day. If you have liver disease don't take tylenol. You may also take ibuprofen 600mg every 6-8 hours as needed for pain. Do not exceed 2,400 mg per day. If you experience stomach pain or you have a history of kidney disease stop taking ibuprofen. You may alternate application of ice and heat 20 minutes at a time as desired. Follow up: Please follow up with your primary care doctor within one week or as needed.

## 2023-09-09 ENCOUNTER — HOSPITAL ENCOUNTER (EMERGENCY)
Age: 58
Discharge: HOME | End: 2023-09-09
Attending: EMERGENCY MEDICINE
Payer: COMMERCIAL

## 2023-09-09 ENCOUNTER — APPOINTMENT (OUTPATIENT)
Dept: GENERAL RADIOLOGY | Age: 58
End: 2023-09-09
Payer: COMMERCIAL

## 2023-09-09 VITALS
SYSTOLIC BLOOD PRESSURE: 157 MMHG | DIASTOLIC BLOOD PRESSURE: 98 MMHG | RESPIRATION RATE: 18 BRPM | HEIGHT: 66 IN | TEMPERATURE: 98.6 F | OXYGEN SATURATION: 96 % | BODY MASS INDEX: 46.61 KG/M2 | WEIGHT: 290 LBS | HEART RATE: 82 BPM

## 2023-09-09 DIAGNOSIS — L03.115 CELLULITIS OF RIGHT LEG: Primary | ICD-10-CM

## 2023-09-09 LAB
ALBUMIN SERPL-MCNC: 4.1 G/DL (ref 3.5–5.2)
ALP SERPL-CCNC: 117 U/L (ref 35–104)
ALT SERPL-CCNC: 32 U/L (ref 5–33)
ANION GAP SERPL CALCULATED.3IONS-SCNC: 10 MMOL/L (ref 9–17)
AST SERPL-CCNC: 19 U/L
BASOPHILS # BLD: 0 K/UL (ref 0–0.2)
BASOPHILS NFR BLD: 1 % (ref 0–2)
BILIRUB SERPL-MCNC: 0.4 MG/DL (ref 0.3–1.2)
BUN SERPL-MCNC: 17 MG/DL (ref 6–20)
CALCIUM SERPL-MCNC: 9 MG/DL (ref 8.6–10.4)
CHLORIDE SERPL-SCNC: 104 MMOL/L (ref 98–107)
CO2 SERPL-SCNC: 26 MMOL/L (ref 20–31)
CREAT SERPL-MCNC: 0.7 MG/DL (ref 0.5–0.9)
CRP SERPL HS-MCNC: <3 MG/L (ref 0–5)
EOSINOPHIL # BLD: 0.2 K/UL (ref 0–0.4)
EOSINOPHILS RELATIVE PERCENT: 3 % (ref 0–4)
ERYTHROCYTE [DISTWIDTH] IN BLOOD BY AUTOMATED COUNT: 13.2 % (ref 11.5–14.9)
ERYTHROCYTE [SEDIMENTATION RATE] IN BLOOD BY PHOTOMETRIC METHOD: 5 MM/HR (ref 0–30)
GFR SERPL CREATININE-BSD FRML MDRD: >60 ML/MIN/1.73M2
GLUCOSE SERPL-MCNC: 201 MG/DL (ref 70–99)
HCT VFR BLD AUTO: 42.1 % (ref 36–46)
HGB BLD-MCNC: 13.8 G/DL (ref 12–16)
LYMPHOCYTES NFR BLD: 1.7 K/UL (ref 1–4.8)
LYMPHOCYTES RELATIVE PERCENT: 34 % (ref 24–44)
MCH RBC QN AUTO: 28 PG (ref 26–34)
MCHC RBC AUTO-ENTMCNC: 32.8 G/DL (ref 31–37)
MCV RBC AUTO: 85.4 FL (ref 80–100)
MONOCYTES NFR BLD: 0.4 K/UL (ref 0.1–1.3)
MONOCYTES NFR BLD: 8 % (ref 1–7)
NEUTROPHILS NFR BLD: 54 % (ref 36–66)
NEUTS SEG NFR BLD: 2.6 K/UL (ref 1.3–9.1)
PLATELET # BLD AUTO: 221 K/UL (ref 150–450)
PMV BLD AUTO: 8.6 FL (ref 6–12)
POTASSIUM SERPL-SCNC: 3.9 MMOL/L (ref 3.7–5.3)
PROT SERPL-MCNC: 7.2 G/DL (ref 6.4–8.3)
RBC # BLD AUTO: 4.93 M/UL (ref 4–5.2)
SODIUM SERPL-SCNC: 140 MMOL/L (ref 135–144)
WBC OTHER # BLD: 4.9 K/UL (ref 3.5–11)

## 2023-09-09 PROCEDURE — 99284 EMERGENCY DEPT VISIT MOD MDM: CPT

## 2023-09-09 PROCEDURE — 36415 COLL VENOUS BLD VENIPUNCTURE: CPT

## 2023-09-09 PROCEDURE — 85025 COMPLETE CBC W/AUTO DIFF WBC: CPT

## 2023-09-09 PROCEDURE — 85652 RBC SED RATE AUTOMATED: CPT

## 2023-09-09 PROCEDURE — 80053 COMPREHEN METABOLIC PANEL: CPT

## 2023-09-09 PROCEDURE — 73590 X-RAY EXAM OF LOWER LEG: CPT

## 2023-09-09 PROCEDURE — 86140 C-REACTIVE PROTEIN: CPT

## 2023-09-09 RX ORDER — CEPHALEXIN 500 MG/1
500 CAPSULE ORAL 4 TIMES DAILY
Qty: 28 CAPSULE | Refills: 0 | Status: SHIPPED | OUTPATIENT
Start: 2023-09-09 | End: 2023-09-16

## 2023-09-09 RX ORDER — CEPHALEXIN 500 MG/1
500 CAPSULE ORAL 4 TIMES DAILY
Qty: 20 CAPSULE | Refills: 0 | Status: SHIPPED | OUTPATIENT
Start: 2023-09-09 | End: 2023-09-09 | Stop reason: SDUPTHER

## 2023-09-09 ASSESSMENT — LIFESTYLE VARIABLES
HOW OFTEN DO YOU HAVE A DRINK CONTAINING ALCOHOL: NEVER
HOW MANY STANDARD DRINKS CONTAINING ALCOHOL DO YOU HAVE ON A TYPICAL DAY: PATIENT DOES NOT DRINK
HOW OFTEN DO YOU HAVE A DRINK CONTAINING ALCOHOL: NEVER

## 2023-09-09 ASSESSMENT — ENCOUNTER SYMPTOMS
COUGH: 0
SHORTNESS OF BREATH: 0
ABDOMINAL PAIN: 0
VOMITING: 0
NAUSEA: 0

## 2023-09-09 ASSESSMENT — PAIN - FUNCTIONAL ASSESSMENT: PAIN_FUNCTIONAL_ASSESSMENT: 0-10

## 2023-09-09 ASSESSMENT — PAIN SCALES - GENERAL: PAINLEVEL_OUTOF10: 3

## 2023-09-09 NOTE — ED TRIAGE NOTES
Mode of arrival (squad #, walk in, police, etc) : Walk-in        Chief complaint(s): Leg pain (right)         Arrival Note (brief scenario, treatment PTA, etc). : Pt arrived to ED with c/o right lower leg pain. Pt states about 3-4 weeks ago she fell down her stairs. Pt states since then she has had bruising but bruising went away and now she has redness to shin area. Denies fever/chills. C= \"Have you ever felt that you should Cut down on your drinking? \"  No  A= \"Have people Annoyed you by criticizing your drinking? \"  No  G= \"Have you ever felt bad or Guilty about your drinking? \"  No  E= \"Have you ever had a drink as an Eye-opener first thing in the morning to steady your nerves or to help a hangover? \"  No      Deferred []      Reason for deferring: N/A    *If yes to two or more: probable alcohol abuse. *

## 2024-02-07 ENCOUNTER — APPOINTMENT (OUTPATIENT)
Dept: GENERAL RADIOLOGY | Age: 59
End: 2024-02-07
Payer: COMMERCIAL

## 2024-02-07 ENCOUNTER — HOSPITAL ENCOUNTER (EMERGENCY)
Age: 59
Discharge: HOME | End: 2024-02-07
Attending: EMERGENCY MEDICINE
Payer: COMMERCIAL

## 2024-02-07 VITALS
HEART RATE: 71 BPM | BODY MASS INDEX: 39.37 KG/M2 | SYSTOLIC BLOOD PRESSURE: 116 MMHG | OXYGEN SATURATION: 93 % | HEIGHT: 66 IN | RESPIRATION RATE: 19 BRPM | TEMPERATURE: 97.4 F | DIASTOLIC BLOOD PRESSURE: 66 MMHG | WEIGHT: 245 LBS

## 2024-02-07 DIAGNOSIS — R07.9 CHEST PAIN, UNSPECIFIED TYPE: Primary | ICD-10-CM

## 2024-02-07 DIAGNOSIS — R06.00 DYSPNEA, UNSPECIFIED TYPE: ICD-10-CM

## 2024-02-07 DIAGNOSIS — I48.91 ATRIAL FIBRILLATION, UNSPECIFIED TYPE (HCC): ICD-10-CM

## 2024-02-07 LAB
ANION GAP SERPL CALCULATED.3IONS-SCNC: 14 MMOL/L (ref 9–17)
BASOPHILS # BLD: 0.04 K/UL (ref 0–0.2)
BASOPHILS NFR BLD: 1 % (ref 0–2)
BNP SERPL-MCNC: 196 PG/ML
BUN SERPL-MCNC: 15 MG/DL (ref 6–20)
CALCIUM SERPL-MCNC: 9.3 MG/DL (ref 8.6–10.4)
CHLORIDE SERPL-SCNC: 103 MMOL/L (ref 98–107)
CO2 SERPL-SCNC: 23 MMOL/L (ref 20–31)
CREAT SERPL-MCNC: 0.8 MG/DL (ref 0.5–0.9)
D DIMER PPP FEU-MCNC: <0.27 UG/ML FEU (ref 0–0.57)
EOSINOPHIL # BLD: 0.17 K/UL (ref 0–0.44)
EOSINOPHILS RELATIVE PERCENT: 3 % (ref 1–4)
ERYTHROCYTE [DISTWIDTH] IN BLOOD BY AUTOMATED COUNT: 12.9 % (ref 11.8–14.4)
GFR SERPL CREATININE-BSD FRML MDRD: >60 ML/MIN/1.73M2
GLUCOSE SERPL-MCNC: 238 MG/DL (ref 70–99)
HCT VFR BLD AUTO: 49.8 % (ref 36.3–47.1)
HGB BLD-MCNC: 16.4 G/DL (ref 11.9–15.1)
IMM GRANULOCYTES # BLD AUTO: 0.06 K/UL (ref 0–0.3)
IMM GRANULOCYTES NFR BLD: 1 %
LYMPHOCYTES NFR BLD: 1.96 K/UL (ref 1.1–3.7)
LYMPHOCYTES RELATIVE PERCENT: 35 % (ref 24–43)
MAGNESIUM SERPL-MCNC: 1.9 MG/DL (ref 1.6–2.6)
MCH RBC QN AUTO: 28.2 PG (ref 25.2–33.5)
MCHC RBC AUTO-ENTMCNC: 32.9 G/DL (ref 28.4–34.8)
MCV RBC AUTO: 85.6 FL (ref 82.6–102.9)
MONOCYTES NFR BLD: 0.4 K/UL (ref 0.1–1.2)
MONOCYTES NFR BLD: 7 % (ref 3–12)
NEUTROPHILS NFR BLD: 53 % (ref 36–65)
NEUTS SEG NFR BLD: 3.02 K/UL (ref 1.5–8.1)
NRBC BLD-RTO: 0 PER 100 WBC
PLATELET # BLD AUTO: 225 K/UL (ref 138–453)
PMV BLD AUTO: 10.5 FL (ref 8.1–13.5)
POTASSIUM SERPL-SCNC: 3.5 MMOL/L (ref 3.7–5.3)
RBC # BLD AUTO: 5.82 M/UL (ref 3.95–5.11)
SODIUM SERPL-SCNC: 140 MMOL/L (ref 135–144)
TROPONIN I SERPL HS-MCNC: 10 NG/L (ref 0–14)
TROPONIN I SERPL HS-MCNC: 12 NG/L (ref 0–14)
WBC OTHER # BLD: 5.7 K/UL (ref 3.5–11.3)

## 2024-02-07 PROCEDURE — 99285 EMERGENCY DEPT VISIT HI MDM: CPT

## 2024-02-07 PROCEDURE — 84484 ASSAY OF TROPONIN QUANT: CPT

## 2024-02-07 PROCEDURE — 71045 X-RAY EXAM CHEST 1 VIEW: CPT

## 2024-02-07 PROCEDURE — 73130 X-RAY EXAM OF HAND: CPT

## 2024-02-07 PROCEDURE — 83735 ASSAY OF MAGNESIUM: CPT

## 2024-02-07 PROCEDURE — 96360 HYDRATION IV INFUSION INIT: CPT

## 2024-02-07 PROCEDURE — 83880 ASSAY OF NATRIURETIC PEPTIDE: CPT

## 2024-02-07 PROCEDURE — 85379 FIBRIN DEGRADATION QUANT: CPT

## 2024-02-07 PROCEDURE — 93005 ELECTROCARDIOGRAM TRACING: CPT | Performed by: EMERGENCY MEDICINE

## 2024-02-07 PROCEDURE — 2580000003 HC RX 258: Performed by: EMERGENCY MEDICINE

## 2024-02-07 PROCEDURE — 6370000000 HC RX 637 (ALT 250 FOR IP): Performed by: EMERGENCY MEDICINE

## 2024-02-07 PROCEDURE — 85025 COMPLETE CBC W/AUTO DIFF WBC: CPT

## 2024-02-07 PROCEDURE — 80048 BASIC METABOLIC PNL TOTAL CA: CPT

## 2024-02-07 RX ORDER — 0.9 % SODIUM CHLORIDE 0.9 %
1000 INTRAVENOUS SOLUTION INTRAVENOUS ONCE
Status: COMPLETED | OUTPATIENT
Start: 2024-02-07 | End: 2024-02-07

## 2024-02-07 RX ORDER — ACETAMINOPHEN 500 MG
1000 TABLET ORAL ONCE
Status: COMPLETED | OUTPATIENT
Start: 2024-02-07 | End: 2024-02-07

## 2024-02-07 RX ORDER — METOPROLOL SUCCINATE 50 MG/1
50 TABLET, EXTENDED RELEASE ORAL ONCE
Status: COMPLETED | OUTPATIENT
Start: 2024-02-07 | End: 2024-02-07

## 2024-02-07 RX ADMIN — ACETAMINOPHEN 1000 MG: 500 TABLET ORAL at 14:47

## 2024-02-07 RX ADMIN — SODIUM CHLORIDE 1000 ML: 9 INJECTION, SOLUTION INTRAVENOUS at 14:59

## 2024-02-07 RX ADMIN — METOPROLOL SUCCINATE 50 MG: 50 TABLET, FILM COATED, EXTENDED RELEASE ORAL at 14:07

## 2024-02-07 ASSESSMENT — ENCOUNTER SYMPTOMS
VOMITING: 0
DIARRHEA: 0
SHORTNESS OF BREATH: 1
ABDOMINAL PAIN: 0

## 2024-02-07 ASSESSMENT — LIFESTYLE VARIABLES
HOW OFTEN DO YOU HAVE A DRINK CONTAINING ALCOHOL: NEVER
HOW MANY STANDARD DRINKS CONTAINING ALCOHOL DO YOU HAVE ON A TYPICAL DAY: PATIENT DOES NOT DRINK

## 2024-02-07 ASSESSMENT — PAIN DESCRIPTION - LOCATION: LOCATION: HEAD

## 2024-02-07 ASSESSMENT — PAIN - FUNCTIONAL ASSESSMENT: PAIN_FUNCTIONAL_ASSESSMENT: 0-10

## 2024-02-07 ASSESSMENT — PAIN SCALES - GENERAL
PAINLEVEL_OUTOF10: 5
PAINLEVEL_OUTOF10: 6

## 2024-02-07 NOTE — ED TRIAGE NOTES
Chest pain that began 1400, yesterday , feels like someone standing on her chest   No N/V   Does have shortness of breath came on sudden   When she stands up the shortness of breath comes on  Rates the pain 5/10, sharp,

## 2024-02-07 NOTE — ED PROVIDER NOTES
[facility] ED  Emergency Department Encounter  Emergency Medicine Resident     Pt Name:Mayelin Loredo  MRN: [id number]  Birthdate 1965  Date of evaluation: 2/7/24  PCP:  Luan Montano MD  Note Started: 1:17 PM EST      CHIEF COMPLAINT       Chief Complaint   Patient presents with    Chest Pain       HISTORY OF PRESENT ILLNESS  (Location/Symptom, Timing/Onset, Context/Setting, Quality, Duration, Modifying Factors, Severity.)      Mayelin Loredo is a 58 y.o. female who presents with chest pressure difficulty breathing.  Patient stated her symptoms started last night.  Patient has a history of atrial fibrillation and is supposed to be taking metoprolol however the patient is noncompliant with her medication.  Patient also has a prescription for Lasix which she states she is supposed to take as needed however she also has not been taking that medication.  Patient states she takes her lisinopril with hydrochlorothiazide occasionally last dose was this morning.  Patient also endorses worsening bilateral leg swelling over the past 5 weeks.  Patient is not on any anticoagulation for her atrial fibrillation.  Patient denies any history of DVT, PE, or strokes in the past.    PAST MEDICAL / SURGICAL / SOCIAL / FAMILY HISTORY      has a past medical history of Hyperlipidemia, Hypertension, and Ulcer.     has a past surgical history that includes Tonsillectomy; Dental surgery; and pr tendon sheath incision (Right, 2/23/2017).    Social History     Socioeconomic History    Marital status: Single     Spouse name: Not on file    Number of children: Not on file    Years of education: Not on file    Highest education level: Not on file   Occupational History    Not on file   Tobacco Use    Smoking status: Never    Smokeless tobacco: Never   Vaping Use    Vaping Use: Never used   Substance and Sexual Activity    Alcohol use: No    Drug use: No    Sexual activity: Not on file   Other Topics Concern    Not on 
Interpretation    Interpreted by me  Atrial fibrillation with a rapid ventricular sponsor ventricular rate of 141, normal QRS duration, normal axis, ST and T wave abnormality consider inferior ischemia,  Compared EKG of November 13, 2021, ventricular rate has increased by 83 ST and T wave changes inferiorly are new      Medical Decision Making  Problems Addressed:  Atrial fibrillation, unspecified type (HCC): chronic illness or injury with exacerbation, progression, or side effects of treatment  Chest pain, unspecified type: acute illness or injury  Dyspnea, unspecified type: acute illness or injury    Amount and/or Complexity of Data Reviewed  Labs: ordered. Decision-making details documented in ED Course.  Radiology: ordered.  ECG/medicine tests: ordered.    Risk  OTC drugs.  Prescription drug management.      Observation admission was offered to the patient however she is declining at this time.  Patient understands the risk.        (Please note that portions of this note were completed with a voice recognition program.  Efforts were made to edit the dictations but occasionally words are mis-transcribed.)    Anand Diaz MD, FACEP  Attending Emergency Medicine Physician         Anand Diaz MD  02/07/24 2739

## 2024-02-07 NOTE — DISCHARGE INSTRUCTIONS
You were seen here for chest pain.  You were found to be in atrial fibrillation which responded well to metoprolol.  We offered you hospital admission for cardiology evaluation however he wanted to go home and follow-up with cardiology and your primary care doctor outpatient.  You need to call the cardiologist and your primary care doctor for soon as possible to schedule outpatient appointment.    You need to take your medications as prescribed as it is very important.    Return to the emergency department immediately if you experience worsening symptoms including persistent chest pain, difficulty breathing, vomiting, abdominal pain, back pain, fatigue, lightheadedness, if you develop any new symptoms, or if you have any other concerns.

## 2024-02-09 LAB
EKG ATRIAL RATE: 141 BPM
EKG ATRIAL RATE: 80 BPM
EKG P AXIS: 43 DEGREES
EKG P-R INTERVAL: 146 MS
EKG Q-T INTERVAL: 302 MS
EKG Q-T INTERVAL: 384 MS
EKG QRS DURATION: 82 MS
EKG QRS DURATION: 84 MS
EKG QTC CALCULATION (BAZETT): 442 MS
EKG QTC CALCULATION (BAZETT): 462 MS
EKG R AXIS: 23 DEGREES
EKG R AXIS: 42 DEGREES
EKG T AXIS: -81 DEGREES
EKG T AXIS: 29 DEGREES
EKG VENTRICULAR RATE: 141 BPM
EKG VENTRICULAR RATE: 80 BPM

## 2024-05-17 ENCOUNTER — HOSPITAL ENCOUNTER (OUTPATIENT)
Age: 59
Setting detail: OUTPATIENT SURGERY
Discharge: HOME | End: 2024-05-17
Attending: INTERNAL MEDICINE | Admitting: INTERNAL MEDICINE
Payer: COMMERCIAL

## 2024-05-17 VITALS
HEIGHT: 68 IN | HEART RATE: 73 BPM | OXYGEN SATURATION: 97 % | DIASTOLIC BLOOD PRESSURE: 78 MMHG | SYSTOLIC BLOOD PRESSURE: 107 MMHG | TEMPERATURE: 98 F | BODY MASS INDEX: 43.35 KG/M2 | WEIGHT: 286 LBS | RESPIRATION RATE: 22 BRPM

## 2024-05-17 DIAGNOSIS — R94.39 ABNORMAL STRESS TEST: ICD-10-CM

## 2024-05-17 LAB
BUN BLD-MCNC: 14 MG/DL (ref 8–26)
CHLORIDE BLD-SCNC: 103 MMOL/L (ref 98–107)
ECHO BSA: 2.49 M2
EGFR, POC: >90 ML/MIN/1.73M2
GLUCOSE BLD-MCNC: 325 MG/DL (ref 74–100)
HCT VFR BLD AUTO: 49 % (ref 36–46)
PLATELET # BLD AUTO: 221 K/UL (ref 138–453)
POC CREATININE: 0.7 MG/DL (ref 0.51–1.19)
POC HEMOGLOBIN (CALC): 16.7 G/DL (ref 12–16)
POTASSIUM BLD-SCNC: 3.5 MMOL/L (ref 3.5–4.5)
SODIUM BLD-SCNC: 141 MMOL/L (ref 138–146)

## 2024-05-17 PROCEDURE — 6360000002 HC RX W HCPCS: Performed by: INTERNAL MEDICINE

## 2024-05-17 PROCEDURE — 99153 MOD SED SAME PHYS/QHP EA: CPT | Performed by: INTERNAL MEDICINE

## 2024-05-17 PROCEDURE — 82947 ASSAY GLUCOSE BLOOD QUANT: CPT

## 2024-05-17 PROCEDURE — 84520 ASSAY OF UREA NITROGEN: CPT

## 2024-05-17 PROCEDURE — 2500000003 HC RX 250 WO HCPCS: Performed by: INTERNAL MEDICINE

## 2024-05-17 PROCEDURE — 2709999900 HC NON-CHARGEABLE SUPPLY: Performed by: INTERNAL MEDICINE

## 2024-05-17 PROCEDURE — 93454 CORONARY ARTERY ANGIO S&I: CPT | Performed by: INTERNAL MEDICINE

## 2024-05-17 PROCEDURE — 99152 MOD SED SAME PHYS/QHP 5/>YRS: CPT | Performed by: INTERNAL MEDICINE

## 2024-05-17 PROCEDURE — 84295 ASSAY OF SERUM SODIUM: CPT

## 2024-05-17 PROCEDURE — 7100000010 HC PHASE II RECOVERY - FIRST 15 MIN: Performed by: INTERNAL MEDICINE

## 2024-05-17 PROCEDURE — 82435 ASSAY OF BLOOD CHLORIDE: CPT

## 2024-05-17 PROCEDURE — 7100000011 HC PHASE II RECOVERY - ADDTL 15 MIN: Performed by: INTERNAL MEDICINE

## 2024-05-17 PROCEDURE — 84132 ASSAY OF SERUM POTASSIUM: CPT

## 2024-05-17 PROCEDURE — 93458 L HRT ARTERY/VENTRICLE ANGIO: CPT | Performed by: INTERNAL MEDICINE

## 2024-05-17 PROCEDURE — 2580000003 HC RX 258: Performed by: INTERNAL MEDICINE

## 2024-05-17 PROCEDURE — C1894 INTRO/SHEATH, NON-LASER: HCPCS | Performed by: INTERNAL MEDICINE

## 2024-05-17 PROCEDURE — 99221 1ST HOSP IP/OBS SF/LOW 40: CPT | Performed by: INTERNAL MEDICINE

## 2024-05-17 PROCEDURE — 85014 HEMATOCRIT: CPT

## 2024-05-17 PROCEDURE — 85049 AUTOMATED PLATELET COUNT: CPT

## 2024-05-17 PROCEDURE — 6360000004 HC RX CONTRAST MEDICATION: Performed by: INTERNAL MEDICINE

## 2024-05-17 PROCEDURE — C1769 GUIDE WIRE: HCPCS | Performed by: INTERNAL MEDICINE

## 2024-05-17 PROCEDURE — 82565 ASSAY OF CREATININE: CPT

## 2024-05-17 RX ORDER — SODIUM CHLORIDE 9 MG/ML
INJECTION, SOLUTION INTRAVENOUS PRN
Status: DISCONTINUED | OUTPATIENT
Start: 2024-05-17 | End: 2024-05-17 | Stop reason: HOSPADM

## 2024-05-17 RX ORDER — FENTANYL CITRATE 50 UG/ML
INJECTION, SOLUTION INTRAMUSCULAR; INTRAVENOUS PRN
Status: DISCONTINUED | OUTPATIENT
Start: 2024-05-17 | End: 2024-05-17 | Stop reason: HOSPADM

## 2024-05-17 RX ORDER — 0.9 % SODIUM CHLORIDE 0.9 %
INTRAVENOUS SOLUTION INTRAVENOUS CONTINUOUS PRN
Status: COMPLETED | OUTPATIENT
Start: 2024-05-17 | End: 2024-05-17

## 2024-05-17 RX ORDER — ACETAMINOPHEN 325 MG/1
650 TABLET ORAL EVERY 4 HOURS PRN
Status: DISCONTINUED | OUTPATIENT
Start: 2024-05-17 | End: 2024-05-17 | Stop reason: HOSPADM

## 2024-05-17 RX ORDER — HEPARIN SODIUM 1000 [USP'U]/ML
INJECTION, SOLUTION INTRAVENOUS; SUBCUTANEOUS PRN
Status: DISCONTINUED | OUTPATIENT
Start: 2024-05-17 | End: 2024-05-17 | Stop reason: HOSPADM

## 2024-05-17 RX ORDER — ATROPINE SULFATE 0.1 MG/ML
INJECTION INTRAVENOUS PRN
Status: DISCONTINUED | OUTPATIENT
Start: 2024-05-17 | End: 2024-05-17 | Stop reason: HOSPADM

## 2024-05-17 RX ORDER — VERAPAMIL HYDROCHLORIDE 2.5 MG/ML
INJECTION, SOLUTION INTRAVENOUS PRN
Status: DISCONTINUED | OUTPATIENT
Start: 2024-05-17 | End: 2024-05-17 | Stop reason: HOSPADM

## 2024-05-17 RX ORDER — NITROGLYCERIN 20 MG/100ML
INJECTION INTRAVENOUS PRN
Status: DISCONTINUED | OUTPATIENT
Start: 2024-05-17 | End: 2024-05-17 | Stop reason: HOSPADM

## 2024-05-17 RX ORDER — SODIUM CHLORIDE 0.9 % (FLUSH) 0.9 %
5-40 SYRINGE (ML) INJECTION PRN
Status: DISCONTINUED | OUTPATIENT
Start: 2024-05-17 | End: 2024-05-17 | Stop reason: HOSPADM

## 2024-05-17 RX ORDER — SODIUM CHLORIDE 9 MG/ML
INJECTION, SOLUTION INTRAVENOUS CONTINUOUS
Status: DISCONTINUED | OUTPATIENT
Start: 2024-05-17 | End: 2024-05-17 | Stop reason: HOSPADM

## 2024-05-17 RX ORDER — SODIUM CHLORIDE 0.9 % (FLUSH) 0.9 %
5-40 SYRINGE (ML) INJECTION EVERY 12 HOURS SCHEDULED
Status: DISCONTINUED | OUTPATIENT
Start: 2024-05-17 | End: 2024-05-17 | Stop reason: HOSPADM

## 2024-05-17 RX ORDER — MIDAZOLAM HYDROCHLORIDE 1 MG/ML
INJECTION INTRAMUSCULAR; INTRAVENOUS PRN
Status: DISCONTINUED | OUTPATIENT
Start: 2024-05-17 | End: 2024-05-17 | Stop reason: HOSPADM

## 2024-05-17 RX ADMIN — SODIUM CHLORIDE: 9 INJECTION, SOLUTION INTRAVENOUS at 09:20

## 2024-05-17 NOTE — PROGRESS NOTES
Patient received post cath to pcc room 11 /  supine with blood pressure to monitor.. Assessment obtained.  Post cath pathway initiated. Right radial site with VASC BAND intact / 10 ml air. No hematoma noted. Restrictions reviewed with family and patient.  Patient without complaints.  Side rails up.

## 2024-05-17 NOTE — PROGRESS NOTES
Awake and sitting up in bed.  Blood pressure stable.  Family at bedside.  Right wrist remains clean soft and site dry.

## 2024-05-17 NOTE — PROGRESS NOTES
Patient admitted, consent signed and questions answered. Patient ready for procedure. Call light to reach with side rails up 2 of 2. Bilat groin hairs clipped with writer and Elly present / groins excoriated and red..  No family at bedside with patient.  History and physical needed and awaiting MD.

## 2024-05-17 NOTE — PROGRESS NOTES
Remaining air in vasc band removed. Vasc band removed. Pressure dressing applied. Radial pulse palpable.

## 2024-05-17 NOTE — PROGRESS NOTES
All discharge instructions reviewed with daughter and patient, questions answered.  Patient discharged per ambulation with daughter and belongings.  Again strongly encouraged to follow up with PCP for elevated glucose.

## 2024-05-17 NOTE — H&P
Today's Date: 5/17/2024  Patient Name: Mayelin Loredo  Date of admission: 5/17/2024  8:38 AM  Patient's age: 58 y.o., 1965  Admission Dx: Abnormal stress test [R94.39]    Reason for Consult:  Cardiac evaluation    Requesting Physician: Carlos Olivier MD    CHIEF COMPLAINT:  Dyspnea     History Obtained From:  patient, electronic medical record    HISTORY OF PRESENT ILLNESS:      The patient is a 58 y.o. female referred for a [facility] after a positive stress as below.     Past Medical History:   has a past medical history of Abnormal cardiovascular stress test, Anxiety, Depression, Hyperlipidemia, Hypertension, and Ulcer.    Past Surgical History:   has a past surgical history that includes Tonsillectomy; Dental surgery; pr tendon sheath incision (Right, 02/23/2017); and Cardiac catheterization (05/17/2024).     Home Medications:    Prior to Admission medications    Medication Sig Start Date End Date Taking? Authorizing Provider   furosemide (LASIX) 20 MG tablet Take 1 tablet by mouth at bedtime 3/25/24   Rufina White MD   hydrOXYzine HCl (ATARAX) 25 MG tablet Take 1 tablet by mouth at bedtime 3/26/24   Rufina White MD   escitalopram (LEXAPRO) 10 MG tablet Take 1 tablet by mouth at bedtime    Rufina White MD   potassium chloride (KLOR-CON M) 20 MEQ TBCR extended release tablet Take 1 tablet by mouth daily (with breakfast)  Patient taking differently: Take 1 tablet by mouth at bedtime 11/13/21   Delmy Luo MD   diclofenac sodium (VOLTAREN) 1 % GEL Apply 2 g topically 2 times daily as needed for Pain  Patient not taking: Reported on 4/4/2024 11/13/21   Delmy Luo MD   metoprolol succinate (TOPROL XL) 50 MG extended release tablet Take 1 tablet by mouth daily  Patient taking differently: Take 1 tablet by mouth at bedtime 12/1/20   Amando Leal MD   atorvastatin (LIPITOR) 40 MG tablet Take 1 tablet by mouth nightly    Rufina White MD  mucosa  Respiratory:  Normal excursion and expansion without use of accessory muscles  Resp Auscultation: Good respiratory effort. No for increased work of breathing. On auscultation: clear to auscultation bilaterally  Cardiovascular:  The apical impulse is not displaced  Heart tones are crisp and normal. regular S1 and S2.  Jugular venous pulsation Normal  The carotid upstroke is normal in amplitude and contour without delay or bruit  Peripheral pulses are symmetrical and full   Abdomen:   No masses or tenderness  Bowel sounds present  Extremities:   No Cyanosis or Clubbing   Lower extremity edema: No   Skin: Warm and dry  Neurological:  Alert and oriented.  Moves all extremities well  No abnormalities of mood, affect, memory, mentation, or behavior are noted    Labs:     CBC:   Recent Labs     05/17/24  0913        BMP:   Recent Labs     05/17/24  0906   CREATININE 0.7     BNP: No results for input(s): \"BNP\" in the last 72 hours.  PT/INR: No results for input(s): \"PROTIME\", \"INR\" in the last 72 hours.  APTT:No results for input(s): \"APTT\" in the last 72 hours.  CARDIAC ENZYMES:No results for input(s): \"CKTOTAL\", \"CKMB\", \"CKMBINDEX\", \"TROPONINI\" in the last 72 hours.  FASTING LIPID PANEL:  Lab Results   Component Value Date/Time    HDL 28 08/13/2021 09:03 AM    TRIG 116 11/11/2020 07:05 AM     LIVER PROFILE:No results for input(s): \"AST\", \"ALT\", \"LABALBU\" in the last 72 hours.      Stress 04/2024:    Stress Combined Conclusion: The study is positive for myocardial ischemia and is negative for myocardial infarction. Findings suggest a moderate risk of cardiac events.    Stress Function: Left ventricular function post-stress is normal. Post-stress ejection fraction is 64%.    Perfusion Comments: Prone images were not obtained. LV perfusion is probably abnormal. There is evidence of inducible ischemia.    Perfusion Defect: There is a moderate severity left ventricular stress perfusion defect that is large in

## 2024-05-17 NOTE — PROGRESS NOTES
Patient to procedure / Dr Olivier notified of glucose.  Patient denies being diabetic.  No new orders and patient instructed on need to follow with PCP.

## 2024-05-17 NOTE — DISCHARGE INSTRUCTIONS
DISCHARGE INSTRUCTIONS / ARM CARE POST CATHERIZATION        ENCOURAGE FLUIDS    NO STRENUOUS LIFTING WITH AFFECTED ARM FOR 3 DAYS ANYTHING HEAVIER THAN 8 TO 10 POUNDS    REMOVE BAND-AID/PRESSURE DRESSING THE FOLLOWING DAY AND DO NOT APPLY ANY FURTHER BAND-AIDS    KEEP INCISION CLEAN DRY AND OPEN TO THE AIR / NO HAND LOTION NEAR PUNCTURE SITE    WATCH FOR SIGNS OF INFECTION /  REDNESS / SWELLING / DRAINAGE / WARMTH / TEMPERATURE GREATER THAN 101    IF BLEEDING OCCURS HOLD MANUAL PRESSURE DIRECTLY OVER SITE  (YOU WILL FEEL PULSATION OF ARTERY) AND IF BLEEDING DOES NOT STOP AFTER 2 MINUTES CALL 911    OK TO SHOWER THE NEXT DAY, NO TUB BATHING OR HOT TUBS/SWIMMING FOR 7 DAYS    IF AREA BECOMES HARD AND SWOLLEN AND IF YOU ARE AT ALL CONCERNED SEEK HELP IMMEDIATELY    SEEK HELP IMMEDIATELY IF AFFECTED ARM BECOMES COLD / NUMB / SEVERE PAIN / NAILBEDS TURN BLUE     IF ON METFORMIN / GLUCOPHAGE DO NOT RESTART MEDICATION FOR 48 HOURS    PLEASE PRACTICE GOOD HAND WASHING AND INCLUDE PUNCTURE SITE ESPECIALLY AFTER USING THE RESTROOM    AVOID USING ALCOHOL BASED HAND SANITIZERS FOR ONE WEEK      CALL 911 if you have symptoms including:   Drooping facial muscles   Changes in vision or speech   Difficulty walking or using your limbs   Change in sensation to affected leg, including numbness, feeling cold, or change in color   Extreme sweating, nausea or vomiting   Dizziness or lightheadedness   Chest pain   Rapid, irregular heartbeat   Palpitations   Cough, shortness of breath, or difficulty breathing   Weakness or fainting   If you think you have an emergency, CALL 911 .        SEDATION / ANALGESIA INFORMATION / HOME GOING ADVICE  You have received the sedation/analgesia medication during your visit    Sedation/analgesia is used during short medical procedures under controlled supervision. The medication will produce a strong relaxation. You will be able to hear, speak and follow instructions, but your memory and alertness will be  you are having problems. It's also a good idea to know your test results and keep a list of the medicines you take.    Where can you learn more?    Go to [email or web address] and sign in to your [employer] account. Enter C643 in the Search Health Information box to learn more about “Learning About Coronary Artery Disease (CAD).”    If you do not have an account, please click on the “Sign Up Now” link.

## (undated) DEVICE — STERILE POLYISOPRENE POWDER-FREE SURGICAL GLOVES: Brand: PROTEXIS

## (undated) DEVICE — STERILE POLYISOPRENE POWDER-FREE SURGICAL GLOVES WITH EMOLLIENT COATING: Brand: PROTEXIS

## (undated) DEVICE — AUSTRALIAN MANDRIL WIRE GUIDE STAINLESS STEEL: Brand: COOK

## (undated) DEVICE — DRAPE,EXTREMITY,89X128,STERILE: Brand: MEDLINE

## (undated) DEVICE — HAND PACK ARROWHEAD

## (undated) DEVICE — ANGIOGRAPHIC CATHETER: Brand: EXPO™

## (undated) DEVICE — STERILE LATEX POWDER-FREE SURGICAL GLOVESWITH NITRILE COATING: Brand: PROTEXIS

## (undated) DEVICE — GLIDESHEATH SLENDER ACCESS KIT: Brand: GLIDESHEATH SLENDER

## (undated) DEVICE — CUFF TOURNIQUET 18 SNG BLADDER DUAL PORT

## (undated) DEVICE — SURGICAL PROCEDURE TRAY CRD CATH SVMMC

## (undated) DEVICE — SUTURE ETHLN SZ 4-0 L18IN NONABSORBABLE BLK L16MM PC-3 3/8 1864G

## (undated) DEVICE — BANDAGE COMPR W4XL108IN WHT LAYERED NO CLSR SYN RUB ESMARCH

## (undated) DEVICE — SOLUTION IV IRRIG 500ML 0.9% SODIUM CHL 2F7123